# Patient Record
Sex: FEMALE | Race: WHITE | Employment: FULL TIME | URBAN - METROPOLITAN AREA
[De-identification: names, ages, dates, MRNs, and addresses within clinical notes are randomized per-mention and may not be internally consistent; named-entity substitution may affect disease eponyms.]

---

## 2022-01-31 ENCOUNTER — OFFICE VISIT (OUTPATIENT)
Dept: INTERNAL MEDICINE CLINIC | Age: 27
End: 2022-01-31
Payer: COMMERCIAL

## 2022-01-31 VITALS
WEIGHT: 266.25 LBS | DIASTOLIC BLOOD PRESSURE: 78 MMHG | TEMPERATURE: 98.2 F | HEART RATE: 76 BPM | SYSTOLIC BLOOD PRESSURE: 122 MMHG

## 2022-01-31 DIAGNOSIS — Z76.89 ENCOUNTER TO ESTABLISH CARE: Primary | ICD-10-CM

## 2022-01-31 DIAGNOSIS — F41.8 DEPRESSION WITH ANXIETY: ICD-10-CM

## 2022-01-31 DIAGNOSIS — Z00.00 PREVENTATIVE HEALTH CARE: ICD-10-CM

## 2022-01-31 DIAGNOSIS — F64.9 GENDER DYSPHORIA: ICD-10-CM

## 2022-01-31 LAB
A/G RATIO: 1.5 (ref 1.1–2.2)
ALBUMIN SERPL-MCNC: 4.2 G/DL (ref 3.4–5)
ALP BLD-CCNC: 120 U/L (ref 40–129)
ALT SERPL-CCNC: 12 U/L (ref 10–40)
ANION GAP SERPL CALCULATED.3IONS-SCNC: 12 MMOL/L (ref 3–16)
AST SERPL-CCNC: 15 U/L (ref 15–37)
BASOPHILS ABSOLUTE: 0 K/UL (ref 0–0.2)
BASOPHILS RELATIVE PERCENT: 0.4 %
BILIRUB SERPL-MCNC: 0.5 MG/DL (ref 0–1)
BUN BLDV-MCNC: 14 MG/DL (ref 7–20)
CALCIUM SERPL-MCNC: 9.4 MG/DL (ref 8.3–10.6)
CHLORIDE BLD-SCNC: 105 MMOL/L (ref 99–110)
CO2: 25 MMOL/L (ref 21–32)
CREAT SERPL-MCNC: 1.1 MG/DL (ref 0.6–1.1)
EOSINOPHILS ABSOLUTE: 0.4 K/UL (ref 0–0.6)
EOSINOPHILS RELATIVE PERCENT: 5.2 %
GFR AFRICAN AMERICAN: >60
GFR NON-AFRICAN AMERICAN: >60
GLUCOSE BLD-MCNC: 98 MG/DL (ref 70–99)
HCT VFR BLD CALC: 40.8 % (ref 36–48)
HEMOGLOBIN: 13.8 G/DL (ref 12–16)
LYMPHOCYTES ABSOLUTE: 2.8 K/UL (ref 1–5.1)
LYMPHOCYTES RELATIVE PERCENT: 37.2 %
MCH RBC QN AUTO: 29.8 PG (ref 26–34)
MCHC RBC AUTO-ENTMCNC: 33.8 G/DL (ref 31–36)
MCV RBC AUTO: 88.2 FL (ref 80–100)
MONOCYTES ABSOLUTE: 0.4 K/UL (ref 0–1.3)
MONOCYTES RELATIVE PERCENT: 4.9 %
NEUTROPHILS ABSOLUTE: 3.9 K/UL (ref 1.7–7.7)
NEUTROPHILS RELATIVE PERCENT: 52.3 %
PDW BLD-RTO: 14.2 % (ref 12.4–15.4)
PLATELET # BLD: 286 K/UL (ref 135–450)
PMV BLD AUTO: 8.1 FL (ref 5–10.5)
POTASSIUM SERPL-SCNC: 4.5 MMOL/L (ref 3.5–5.1)
RBC # BLD: 4.62 M/UL (ref 4–5.2)
SODIUM BLD-SCNC: 142 MMOL/L (ref 136–145)
TOTAL PROTEIN: 7 G/DL (ref 6.4–8.2)
TSH REFLEX: 2.52 UIU/ML (ref 0.27–4.2)
VITAMIN D 25-HYDROXY: 7 NG/ML
WBC # BLD: 7.5 K/UL (ref 4–11)

## 2022-01-31 PROCEDURE — 99203 OFFICE O/P NEW LOW 30 MIN: CPT | Performed by: NURSE PRACTITIONER

## 2022-01-31 RX ORDER — ESTRADIOL 2 MG/1
2 TABLET ORAL
COMMUNITY
Start: 2021-09-21

## 2022-01-31 RX ORDER — SPIRONOLACTONE 100 MG/1
100 TABLET, FILM COATED ORAL 2 TIMES DAILY
COMMUNITY
Start: 2021-09-21

## 2022-01-31 RX ORDER — BUPROPION HYDROCHLORIDE 150 MG/1
TABLET, EXTENDED RELEASE ORAL
COMMUNITY
Start: 2021-09-21 | End: 2022-06-28

## 2022-01-31 ASSESSMENT — PATIENT HEALTH QUESTIONNAIRE - PHQ9
1. LITTLE INTEREST OR PLEASURE IN DOING THINGS: 0
2. FEELING DOWN, DEPRESSED OR HOPELESS: 1
SUM OF ALL RESPONSES TO PHQ QUESTIONS 1-9: 1
SUM OF ALL RESPONSES TO PHQ9 QUESTIONS 1 & 2: 1
SUM OF ALL RESPONSES TO PHQ QUESTIONS 1-9: 1

## 2022-01-31 NOTE — PROGRESS NOTES
Date: 1/31/2022                                               Subjective/Objective:     Chief Complaint   Patient presents with    Establish Care       HPI    Marlene Da Silva is a 22 yo female, visit today to [facility] care. Former PCP with [employer]. Denies concerns. History of depression and anxiety managed on bupropion. She follows with psychiatry. She feels that her mood is doing fairly well. She appreciates that the bupropion is decreased her appetite. She denies SI. Denies medication side effects. She would like to see Dr. Paty Daigle. Gender dysphoria - transgender female (MTF). She is currently managed on spironolactone and estradiol, followed by Apollidon. She is planning for gender reassignment surgery with the [address] [place] [address] later this year. Vaccinations: COVID-needs second  Social: Works at [employer]. Never smoker. No alcohol or drug use. Exercise: gym 4-5 times per week, interval training           There are no problems to display for this patient. Past Medical History:   Diagnosis Date    Anxiety     Depression     Gender dysphoria        History reviewed. No pertinent surgical history. No results found for any previous visit. History reviewed. No pertinent family history. Current Outpatient Medications   Medication Sig Dispense Refill    buPROPion (WELLBUTRIN SR) 150 MG extended release tablet One in the am and one at noon      estradiol (ESTRACE) 2 MG tablet Take 3 tablets by mouth daily      spironolactone (ALDACTONE) 100 MG tablet Take 100 mg by mouth 2 times daily       No current facility-administered medications for this visit.        Allergies   Allergen Reactions    Duloxetine Hcl Other (See Comments)     tingling      Graphite Nausea And Vomiting and Other (See Comments)     Exposure causes nausea   Other reaction(s): Headaches, Other (See Comments)  Exposure causes nausea          Review of Systems    Vitals:  /78 (Site: Left Upper Arm, Position: Sitting, Cuff Size: Large Adult)   Pulse 76   Temp 98.2 °F (36.8 °C) (Oral)   Wt 266 lb 4 oz (120.8 kg)     Physical Exam  Vitals reviewed. Constitutional:       General: She is not in acute distress. Appearance: Normal appearance. HENT:      Head: Normocephalic and atraumatic. Right Ear: Tympanic membrane, ear canal and external ear normal.      Left Ear: Tympanic membrane, ear canal and external ear normal.   Cardiovascular:      Rate and Rhythm: Normal rate and regular rhythm. Pulses: Normal pulses. Heart sounds: Normal heart sounds. No murmur heard. Pulmonary:      Effort: Pulmonary effort is normal. No respiratory distress. Breath sounds: Normal breath sounds. No wheezing. Abdominal:      General: Bowel sounds are normal. There is no distension. Palpations: Abdomen is soft. Tenderness: There is no abdominal tenderness. Skin:     General: Skin is warm and dry. Neurological:      General: No focal deficit present. Mental Status: She is alert and oriented to person, place, and time. Psychiatric:         Mood and Affect: Mood normal.         Behavior: Behavior normal.         Thought Content: Thought content normal.         Judgment: Judgment normal.         Assessment/Plan     1. Encounter to establish care    2. Depression with anxiety: stable. Denies SI.    - Follows with psychiatry    3. Gender dysphoria: follows with [facility]. 4. Preventative health care  - COVID second dose encouraged  - Continue regular exercise  - COMPREHENSIVE METABOLIC PANEL; Future  - CBC WITH AUTO DIFFERENTIAL; Future  - TSH with Reflex;  Future  - VITAMIN D 25 HYDROXY; Future        Orders Placed This Encounter   Procedures    COMPREHENSIVE METABOLIC PANEL     Standing Status:   Future     Number of Occurrences:   1     Standing Expiration Date:   1/31/2023    CBC WITH AUTO DIFFERENTIAL     Standing Status:   Future     Number of Occurrences:   1 Standing Expiration Date:   1/31/2023    TSH with Reflex     Standing Status:   Future     Number of Occurrences:   1     Standing Expiration Date:   1/31/2023    VITAMIN D 25 HYDROXY     Standing Status:   Future     Number of Occurrences:   1     Standing Expiration Date:   1/31/2023       Return in about 1 year (around 1/31/2023) for annual exam. OR sooner with questions, concerns, worsening symptoms    RAVEN GLASGOW, NP    1/31/2022  3:24 PM    Discussed use, benefit, and side effects of prescribed medications. Barriers to medication compliance addressed. Discussed all ordered testing and labs. All patient questions answered. Patient agreeable with plan above. Please note that this chart was generated using dragon dictation software. Although every effort was made to ensure the accuracy of this automated transcription, some errors in transcription may have occurred.

## 2022-01-31 NOTE — PATIENT INSTRUCTIONS
COVID vaccine - schedule with pharmacy online  Goal is to exercise (moderate intensity) for at least 150 min a week.  Exercising at least 3-4 days a week is best.   Schedule with Dr Lerma Getting

## 2022-02-11 ENCOUNTER — OFFICE VISIT (OUTPATIENT)
Dept: PSYCHOLOGY | Age: 27
End: 2022-02-11
Payer: COMMERCIAL

## 2022-02-11 DIAGNOSIS — F34.1 PERSISTENT DEPRESSIVE DISORDER WITH ANXIOUS DISTRESS, CURRENTLY MODERATE: Primary | ICD-10-CM

## 2022-02-11 DIAGNOSIS — F41.1 GAD (GENERALIZED ANXIETY DISORDER): ICD-10-CM

## 2022-02-11 PROCEDURE — 90791 PSYCH DIAGNOSTIC EVALUATION: CPT | Performed by: PSYCHOLOGIST

## 2022-02-11 ASSESSMENT — ANXIETY QUESTIONNAIRES
GAD7 TOTAL SCORE: 13
1. FEELING NERVOUS, ANXIOUS, OR ON EDGE: 2-OVER HALF THE DAYS
4. TROUBLE RELAXING: 3-NEARLY EVERY DAY
3. WORRYING TOO MUCH ABOUT DIFFERENT THINGS: 3-NEARLY EVERY DAY
7. FEELING AFRAID AS IF SOMETHING AWFUL MIGHT HAPPEN: 1-SEVERAL DAYS
5. BEING SO RESTLESS THAT IT IS HARD TO SIT STILL: 1-SEVERAL DAYS
6. BECOMING EASILY ANNOYED OR IRRITABLE: 1-SEVERAL DAYS
2. NOT BEING ABLE TO STOP OR CONTROL WORRYING: 2-OVER HALF THE DAYS

## 2022-02-11 ASSESSMENT — PATIENT HEALTH QUESTIONNAIRE - PHQ9
2. FEELING DOWN, DEPRESSED OR HOPELESS: 1
9. THOUGHTS THAT YOU WOULD BE BETTER OFF DEAD, OR OF HURTING YOURSELF: 0
SUM OF ALL RESPONSES TO PHQ QUESTIONS 1-9: 18
5. POOR APPETITE OR OVEREATING: 3
7. TROUBLE CONCENTRATING ON THINGS, SUCH AS READING THE NEWSPAPER OR WATCHING TELEVISION: 1
SUM OF ALL RESPONSES TO PHQ9 QUESTIONS 1 & 2: 3
1. LITTLE INTEREST OR PLEASURE IN DOING THINGS: 2
4. FEELING TIRED OR HAVING LITTLE ENERGY: 3
SUM OF ALL RESPONSES TO PHQ QUESTIONS 1-9: 18
8. MOVING OR SPEAKING SO SLOWLY THAT OTHER PEOPLE COULD HAVE NOTICED. OR THE OPPOSITE, BEING SO FIGETY OR RESTLESS THAT YOU HAVE BEEN MOVING AROUND A LOT MORE THAN USUAL: 3
6. FEELING BAD ABOUT YOURSELF - OR THAT YOU ARE A FAILURE OR HAVE LET YOURSELF OR YOUR FAMILY DOWN: 3
3. TROUBLE FALLING OR STAYING ASLEEP: 2

## 2022-02-11 NOTE — PROGRESS NOTES
Behavioral Health Consultation  Jessica Flores, Ph.D.  Psychologist  2/11/2022  11:30 AM EST      Time spent with Patient: 30 minutes  [facility]. Reason for Consult: depression; anxiety; r/o trauma  Referring Provider: Geno Villalobos, [address] [facility] [address] [place] [address]    Feedback for PCP:     Pt provided informed consent for the behavioral health program. Discussed with patient model of service to include the limits of confidentiality (i.e. abuse reporting, suicide intervention, etc.) and short-term intervention focused approach. Pt indicated understanding. Feedback given to PCP. S:  Patient reports that she has had to let go of her mother and father due to the toxic effect of her father's bullying and her mother's co-dependency and passivity. She said that she has lived with the consequences of trauma for most of her life and recently she's made a commitment to her own growth. She's haunted by the thinking error of \"you're never enough\" which created the tendency towards perfectionism, that one of the roots of her anxiety. She reports that she receives kudos from her supervisors at [employer] where she is a \". \" She said that reading [employer] helped her put her pieces into perspective. She says eventually she wants to be a mental health counselor. Mental health history: has had counseling experiences, mostly at school; she said that her attempts at finding other therapists have not been successful because she intuited that she knew more than they did, in a humble fashion.      Social History     Tobacco Use    Smoking status: Never Smoker    Smokeless tobacco: Never Used   Substance Use Topics    Alcohol use: Not Currently        Illicit drugs:   Social History     Substance and Sexual Activity   Drug Use Not Currently        O:  MSE:  Appearance: good hygiene   Attitude: cooperative and friendly  Consciousness: alert  Orientation: oriented to person, place, time, general circumstance  Memory: recent and remote memory intact  Attention/Concentration: intact during session  Psychomotor Activity: normal  Eye Contact: normal  Speech: normal rate and volume, well-articulated  Mood: mostly euthymic  Affect: congruent  Perception: within normal limits  Thought Content: within normal limits  Thought Process: logical, coherent and goal-directed  Insight: good  Judgment: intact  Ability to understand instructions: Yes  Ability to respond meaningfully: Yes  Morbid Ideation: no   Suicide Assessment: no suicidal ideation, plan, or intent  Homicidal Ideation: no    A:  We talked about evidence-based treatment for anxiety and also talked about trauma-informed care. She was given handouts to help her begin to start a deep breathing practice and to identify her favorite thinking errors that we referred to as lies. DANE 7 SCORE 2/11/2022   DANE-7 Total Score 13     Interpretation of DANE-7 score: 5-9 = mild anxiety, 10-14 = moderate anxiety, 15+ = severe anxiety. Recommend referral to behavioral health for scores 10 or greater. PHQ Scores 2/11/2022 1/31/2022   PHQ2 Score 3 1   PHQ9 Score 18 1     Interpretation of Total Score Depression Severity: 1-4 = Minimal depression, 5-9 = Mild depression, 10-14 = Moderate depression, 15-19 = Moderately severe depression, 20-27 = Severe depression    Diagnosis:    1. Persistent depressive disorder with anxious distress, currently moderate    2. DANE (generalized anxiety disorder)        There is no problem list on file for this patient. Plan:  Pt interventions:  Established rapport, [place]-setting to identify pt's primary goals for [facility] visit / overall health, Supportive techniques, Provided Psychoeducation re: treating anxiety, Emphasized self-care as important for managing overall health and Provided handout on deep breathing and managing thinking errors.        Pt

## 2022-02-11 NOTE — PATIENT INSTRUCTIONS
The [facility]) Consultant giving you this message provides team-based care to treat the mind and body. He works directly with your doctor, who will always stay in charge of your care. Most [address] [place] [address] visits are 30 minutes or less. Usually, the [address] [place] [address] provider and your doctor continue to see you until you are starting to do better and have a good plan in place for continued progress. Once that happens, most patients follow-up with just their doctor (though the [address] [place] [address] provider remains available to you as needed). If you are not improving, or if you desire outside mental health treatment, or if your doctor recommends more specialized help, we will be happy to help you find a mental health specialist in the community. Please also note your health insurance may be billed for [facility] visits. Check with your insurance company, and tell the [address] [place] [address] provider, if you have any questions about your [address] [place] [address] coverage. Diaphragmatic Breathing Exercises    Exercise 1:  The Stimulating Breath (also called the Berenice Breath)  This exercise aims to raise energy level and increase alertness. - Inhale and exhale rapidly through your nose, keeping your mouth closed but relaxed. Your breaths in and out should be equal in duration, but as short as possible. This is a noisy breathing exercise. - Try for three in-and-out breath cycles per second. This produces a quick movement of the diaphragm, suggesting a berenice. Breathe normally after each cycle. - Do not do for more than 15 seconds on your first try. Each time you practice the Stimulating Breath, you can increase your time by five seconds or so, until you reach a full minute. If done properly, you may feel invigorated, comparable to the heightened awareness you feel after a good workout. You should feel the effort at the back of the neck, the diaphragm, the chest and the abdomen.  Try this breathing exercise the next time you need an energy boost and feel yourself reaching for a cup of coffee. Exercise 2:  The 4-7-8 (or Relaxing Breath) Exercise  This exercise is utterly simple, takes almost no time, requires no equipment and can be done anywhere. Although you can do the exercise in any position, sit with your back straight while learning the exercise. Place the tip of your tongue against the ridge of tissue just behind your upper front teeth, and keep it there through the entire exercise. You will be exhaling through your mouth around your tongue; try pursing your lips slightly if this seems awkward.  Exhale completely through your mouth, making a whoosh sound.  Close your mouth and inhale quietly through your nose to a mental count of four.  Hold your breath for a count of seven.  Exhale completely through your mouth, making a whoosh sound to a count of eight.  This is one breath. Now inhale again and repeat the cycle three more times for a total of four breaths. Note that you always inhale quietly through your nose and exhale audibly through your mouth. The tip of your tongue stays in position the whole time. Exhalation takes twice as long as inhalation. The absolute time you spend on each phase is not important; the ratio of 4:7:8 is important. If you have trouble holding your breath, speed the exercise up but keep to the ratio of 4:7:8 for the three phases. With practice you can slow it all down and get used to inhaling and exhaling more and more deeply. This exercise is a natural tranquilizer for the nervous system. Unlike tranquilizing drugs, which are often effective when you first take them but then lose their power over time, this exercise is subtle when you first try it but gains in power with repetition and practice. Do it at least twice a day. You cannot do it too frequently. Do NOT do more than 4 breaths at one time for the first month of practice. Later, if you wish, you can extend it to eight breaths.  If you feel a little lightheaded when you first breathe this way, do not be concerned; it will pass. Once you develop this technique by practicing it every day, it will be a very useful tool that you will always have with you. Use it whenever anything upsetting happens - before you react. Use it whenever you are aware of internal tension. Use it to help you fall asleep. This exercise cannot be recommended too highly. Everyone can benefit from it. Exercise 3:   Meditation Exercise: Breath Counting  If you want to get a feel for this challenging work, try your hand at breath counting, a deceptively simple technique. Sit in a comfortable position with the spine straight and head inclined slightly forward. Gently close your eyes and take a few deep breaths. Then let the breath come naturally without trying to influence it. Ideally it will be quiet and slow, but depth and rhythm may vary.  To begin the exercise, count \"one\" to yourself as you exhale.  The next time you exhale, count \"two,\" and so on up to [place]. \"   Then begin a new cycle, counting \"one\" on the next exhalation. Never count higher than \"five,\" and count only when you exhale. You will know your attention has wandered when you find yourself up to \"eight,\" \"12,\" even \"19. \"  Try to do 10 minutes of this form of meditation. Personal Thought  Control. Our thinking often creates anxiety for us. Getting better control of our thinking can go a long way in helping us cope. The following steps can be useful. 1. Let yourself become aware of thoughts you have when you are anxious. What are the words that you are saying to yourself at that moment? Sometimes it takes a little practice before we become aware of our thoughts. Some examples might be:  I know something bad is going to happen, or This is horrible or Randy Cools is this happening to me!?  2. Write your thoughts down. Its much easier to work with our thoughts, analyze them, and replace them if they are in black and white.   3.  Ask yourself the following questions about your thoughts:  a. Is it true? (Is it logically correct? Where is the evidence to support the truth of that thought? Are there alternative ways of thinking that would be more correct?). If a thought is not as true as it could be, replace it with a more realistic and helpful one. The majority of thoughts we have that generate anxiety are not the most realistic appraisals of the situation. b. So what? (If this is logically correct, what does it mean to me? Is there anything I can do about the situation? Is it in my best interest to get anxious about this?). 4. Use coping self-statements. When feeling anxious, you may be able to tell yourself automatic phrases without thinking too much about it. A couple of examples would be phrases such as Its OK, I can handle it, or Ive been through things like this before and have done all right.   Notice that these statements tend to be true for all of us. 5. Notice a change in your emotional state as you change your thinking. As your thoughts become more realistic, you will probably notice a decrease in anxiety and tension, and an increase in your ability to cope. Thinking Errors That Increase Stress, Anger, Depression, Anxiety, and Worry  All-or-Nothing Thinking. You see things in black-and-white categories. It is either one thing or another; there is no room for anything in between. I'm 100% healthy or I must have a fatal disease.   Jumping to Conclusions. You make a negative interpretation even though there are no definite facts that convincingly support your conclusion. My  is late because he has been in a car accident and is now injured on the side of the road.   Fortune-Telling. You anticipate things will turn out badly, convinced the prediction is a fact. Not getting this job will cause us to lose the house.   Should Statements. Musts and oughts are also offenders.  Emotional consequences can include anxiety and anger. I should be able to handle this.    Overgeneralization. Assuming one event is actually a pattern. My hand is a little shaky today, I must have Parkinson's disease.   Disqualifying the Positive. Filtering out or rejecting positive experiences to maintain negative beliefs. Upon hearing that your spouse has checked all the doors and windows and they are all locked you think, But someone could cut out a piece of glass and open the window.   Catastrophizing. Predicting the worst possible outcome imaginable. Terrible, awful, horrible, worst ever might be key words. If I can't get my heart to stop pounding I'm going to die.    Superstitious Thinking. The thought that something you do prevents something awful from happening. Larissa Hickeys my spouse a hug and telling her to be careful before going to work will prevent her from getting in a wreck. I do it every morning and she hasn't gotten in a wreck yet.   Emotional Reasoning. The belief that because you feel a certain way means that the assumptions and association you have with that feeling are true. The fear, doom, and constant anxiety must mean something is seriously wrong with me. 

## 2022-02-18 ENCOUNTER — OFFICE VISIT (OUTPATIENT)
Dept: PSYCHOLOGY | Age: 27
End: 2022-02-18
Payer: COMMERCIAL

## 2022-02-18 DIAGNOSIS — F34.1 PERSISTENT DEPRESSIVE DISORDER WITH ANXIOUS DISTRESS, CURRENTLY MODERATE: Primary | ICD-10-CM

## 2022-02-18 DIAGNOSIS — R45.851 SUICIDAL IDEATION: ICD-10-CM

## 2022-02-18 PROCEDURE — 90832 PSYTX W PT 30 MINUTES: CPT | Performed by: PSYCHOLOGIST

## 2022-02-18 ASSESSMENT — ANXIETY QUESTIONNAIRES
7. FEELING AFRAID AS IF SOMETHING AWFUL MIGHT HAPPEN: 1-SEVERAL DAYS
5. BEING SO RESTLESS THAT IT IS HARD TO SIT STILL: 1-SEVERAL DAYS
4. TROUBLE RELAXING: 3-NEARLY EVERY DAY
6. BECOMING EASILY ANNOYED OR IRRITABLE: 1-SEVERAL DAYS
2. NOT BEING ABLE TO STOP OR CONTROL WORRYING: 1-SEVERAL DAYS
3. WORRYING TOO MUCH ABOUT DIFFERENT THINGS: 1-SEVERAL DAYS
1. FEELING NERVOUS, ANXIOUS, OR ON EDGE: 2-OVER HALF THE DAYS
GAD7 TOTAL SCORE: 10

## 2022-02-18 ASSESSMENT — PATIENT HEALTH QUESTIONNAIRE - PHQ9
8. MOVING OR SPEAKING SO SLOWLY THAT OTHER PEOPLE COULD HAVE NOTICED. OR THE OPPOSITE, BEING SO FIGETY OR RESTLESS THAT YOU HAVE BEEN MOVING AROUND A LOT MORE THAN USUAL: 1
4. FEELING TIRED OR HAVING LITTLE ENERGY: 3
1. LITTLE INTEREST OR PLEASURE IN DOING THINGS: 3
SUM OF ALL RESPONSES TO PHQ9 QUESTIONS 1 & 2: 5
SUM OF ALL RESPONSES TO PHQ QUESTIONS 1-9: 17
7. TROUBLE CONCENTRATING ON THINGS, SUCH AS READING THE NEWSPAPER OR WATCHING TELEVISION: 1
3. TROUBLE FALLING OR STAYING ASLEEP: 2
SUM OF ALL RESPONSES TO PHQ QUESTIONS 1-9: 17
9. THOUGHTS THAT YOU WOULD BE BETTER OFF DEAD, OR OF HURTING YOURSELF: 2
SUM OF ALL RESPONSES TO PHQ QUESTIONS 1-9: 17
SUM OF ALL RESPONSES TO PHQ QUESTIONS 1-9: 15
2. FEELING DOWN, DEPRESSED OR HOPELESS: 2
6. FEELING BAD ABOUT YOURSELF - OR THAT YOU ARE A FAILURE OR HAVE LET YOURSELF OR YOUR FAMILY DOWN: 2
5. POOR APPETITE OR OVEREATING: 1

## 2022-02-18 NOTE — LETTER
[facility]  [address] [address]  Phone: [phone]  Fax: [phone]    Va Gaines, PhD        February 18, 2022     Patient: Tonie Hayward   YOB: 1995   Date of Visit: 2/18/2022       To Whom It May Concern: It is my medical recommendation that Tonie Hayward be seen for psychotherapy once a week on Friday mornings generally from 9am to 10am.    If you have any questions or concerns, please don't hesitate to call.     Sincerely,        Va Gaines, PhD

## 2022-02-18 NOTE — PROGRESS NOTES
Behavioral Health Consultation  Va Gaines, Ph.D.  Psychologist  2/18/2022  9:30 AM EST      Time spent with Patient: 30 minutes  This is patient's second [facility] appointment. Reason for Consult: depression; anxiety; r/o trauma  Referring Provider: Axel Kwon, [address] [facility] [address] [place] [address]    Feedback for PCP:     Pt provided informed consent for the behavioral health program. Discussed with patient model of service to include the limits of confidentiality (i.e. abuse reporting, suicide intervention, etc.) and short-term intervention focused approach. Pt indicated understanding. Feedback given to PCP. S:  Patient reports that she enjoyed herself celebrating her birthday a day early on Sunday. She described some frustration after \"doing my taxes on my own for the first time,\" when she needed some information from last year's taxes from her mother, and her mother was non-committal about helping her. She said that the experience of her mother thwarting her is very familiar throughout her childhood. She also described how when she feels overwhelmed she often \"disappears\" and becomes \"lifeless, watching a movie     Mental health history: has had counseling experiences, mostly at school; she said that her attempts at finding other therapists have not been successful because she intuited that she knew more than they did, in a humble fashion.      Social History     Tobacco Use    Smoking status: Never Smoker    Smokeless tobacco: Never Used   Substance Use Topics    Alcohol use: Not Currently        Illicit drugs:   Social History     Substance and Sexual Activity   Drug Use Not Currently        O:  MSE:  Appearance: good hygiene   Attitude: cooperative and friendly  Consciousness: alert  Orientation: oriented to person, place, time, general circumstance  Memory: recent and remote memory intact  Attention/Concentration: intact during session  Psychomotor Activity: normal  Eye Contact: normal  Speech: normal rate and volume, well-articulated  Mood: mostly euthymic  Affect: congruent  Perception: within normal limits  Thought Content: within normal limits  Thought Process: logical, coherent and goal-directed  Insight: good  Judgment: intact  Ability to understand instructions: Yes  Ability to respond meaningfully: Yes  Morbid Ideation: passive thoughts of death  Suicide Assessment: suicidal ideation without plan or intent  Homicidal Ideation: no    A:  We are figuring out what she want to focus on. Other indications of how she is doing emotionally are rated by her much more positively than the patient's PHQ and DANE in our visits. This will have to be rectified. DANE 7 SCORE 2/18/2022 2/11/2022   DANE-7 Total Score 10 13     Interpretation of DANE-7 score: 5-9 = mild anxiety, 10-14 = moderate anxiety, 15+ = severe anxiety. Recommend referral to behavioral health for scores 10 or greater. PHQ Scores 2/18/2022 2/11/2022 1/31/2022   PHQ2 Score 5 3 1   PHQ9 Score 17 18 1     Interpretation of Total Score Depression Severity: 1-4 = Minimal depression, 5-9 = Mild depression, 10-14 = Moderate depression, 15-19 = Moderately severe depression, 20-27 = Severe depression    Diagnosis:    1. Persistent depressive disorder with anxious distress, currently moderate    2. Suicidal ideation        There is no problem list on file for this patient. Plan:  Pt interventions:  Established rapport, [place]-setting to identify pt's primary goals for [facility] visit / overall health, Supportive techniques, Provided Psychoeducation re: treating anxiety, Emphasized self-care as important for managing overall health and Provided handout on deep breathing and managing thinking errors.        Pt Behavioral Change Plan:  Pt set the following goals:  Pt scheduled F/U visit in one week  See section 'A'

## 2022-02-25 ENCOUNTER — OFFICE VISIT (OUTPATIENT)
Dept: PSYCHOLOGY | Age: 27
End: 2022-02-25
Payer: COMMERCIAL

## 2022-02-25 DIAGNOSIS — F34.1 PERSISTENT DEPRESSIVE DISORDER WITH ANXIOUS DISTRESS, CURRENTLY MODERATE: Primary | ICD-10-CM

## 2022-02-25 DIAGNOSIS — F64.9 GENDER DYSPHORIA: ICD-10-CM

## 2022-02-25 PROCEDURE — 90834 PSYTX W PT 45 MINUTES: CPT | Performed by: PSYCHOLOGIST
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
## 2022-03-04 ENCOUNTER — OFFICE VISIT (OUTPATIENT)
Dept: PSYCHOLOGY | Age: 27
End: 2022-03-04
Payer: COMMERCIAL

## 2022-03-04 DIAGNOSIS — F64.9 GENDER DYSPHORIA: ICD-10-CM

## 2022-03-04 DIAGNOSIS — F34.1 PERSISTENT DEPRESSIVE DISORDER WITH ANXIOUS DISTRESS, CURRENTLY SEVERE: Primary | ICD-10-CM

## 2022-03-04 PROCEDURE — 90834 PSYTX W PT 45 MINUTES: CPT | Performed by: PSYCHOLOGIST

## 2022-03-04 ASSESSMENT — ANXIETY QUESTIONNAIRES
1. FEELING NERVOUS, ANXIOUS, OR ON EDGE: 1-SEVERAL DAYS
5. BEING SO RESTLESS THAT IT IS HARD TO SIT STILL: 0-NOT AT ALL
2. NOT BEING ABLE TO STOP OR CONTROL WORRYING: 0-NOT AT ALL
3. WORRYING TOO MUCH ABOUT DIFFERENT THINGS: 1-SEVERAL DAYS
GAD7 TOTAL SCORE: 3
6. BECOMING EASILY ANNOYED OR IRRITABLE: 0-NOT AT ALL
7. FEELING AFRAID AS IF SOMETHING AWFUL MIGHT HAPPEN: 0-NOT AT ALL
4. TROUBLE RELAXING: 1-SEVERAL DAYS

## 2022-03-04 ASSESSMENT — PATIENT HEALTH QUESTIONNAIRE - PHQ9
2. FEELING DOWN, DEPRESSED OR HOPELESS: 1
SUM OF ALL RESPONSES TO PHQ QUESTIONS 1-9: 16
6. FEELING BAD ABOUT YOURSELF - OR THAT YOU ARE A FAILURE OR HAVE LET YOURSELF OR YOUR FAMILY DOWN: 2
SUM OF ALL RESPONSES TO PHQ9 QUESTIONS 1 & 2: 3
3. TROUBLE FALLING OR STAYING ASLEEP: 3
SUM OF ALL RESPONSES TO PHQ QUESTIONS 1-9: 16
9. THOUGHTS THAT YOU WOULD BE BETTER OFF DEAD, OR OF HURTING YOURSELF: 0
8. MOVING OR SPEAKING SO SLOWLY THAT OTHER PEOPLE COULD HAVE NOTICED. OR THE OPPOSITE, BEING SO FIGETY OR RESTLESS THAT YOU HAVE BEEN MOVING AROUND A LOT MORE THAN USUAL: 2
1. LITTLE INTEREST OR PLEASURE IN DOING THINGS: 2
7. TROUBLE CONCENTRATING ON THINGS, SUCH AS READING THE NEWSPAPER OR WATCHING TELEVISION: 1
5. POOR APPETITE OR OVEREATING: 2
4. FEELING TIRED OR HAVING LITTLE ENERGY: 3
SUM OF ALL RESPONSES TO PHQ QUESTIONS 1-9: 16
SUM OF ALL RESPONSES TO PHQ QUESTIONS 1-9: 16

## 2022-03-04 NOTE — PATIENT INSTRUCTIONS
The [facility]) Consultant giving you this message provides team-based care to treat the mind and body. He works directly with your doctor, who will always stay in charge of your care. Most [facility] visits are 30 minutes or less. Usually, the [facility] provider and your doctor continue to see you until you are starting to do better and have a good plan in place for continued progress. Once that happens, most patients follow-up with just their doctor (though the [facility] provider remains available to you as needed). If you are not improving, or if you desire outside mental health treatment, or if your doctor recommends more specialized help, we will be happy to help you find a mental health specialist in the community. Please also note your health insurance may be billed for [facility] visits. Check with your insurance company, and tell the [facility] provider, if you have any questions about your [facility] coverage. How to Develop Your Own Mindfulness Meditation Practice    There are some key points that you may want to consider to increase the likelihood of success in enjoying your mindfulness meditation practice. 1. Start Small  Many of us think it is productive to set ambitious goals, like meditating for one hour a day. However, I suggest you start small and gradually increase the amount of time you meditate. Five minutes a day is an excellent target for beginners. Why? Do not be tempted to push yourself too hard and accept that even a few minutes of meditation each day are enough to make tangible progress. The point is to opt for consistency over quantity. Start small. 2. Choose a Peaceful Place and a Comfortable Position  You can practice mindfulness in any situation simply by using your five senses to observe yourself and the world around you.  Nevertheless, when you are just getting started with your practice, it's helpful to find a peaceful environment to meditate so you can avoid interruptions. One of the best ways to create optimal conditions is to wake up early in the morning and meditate while everyone else is asleep. Most people prefer to sit, as they tend to fall asleep during meditation if they lie on their couch or bed. Avoid poor posture, which creates physical tension and even mental stress, and sit comfortably. There is no need to force yourself to sit in a bradly position. Sitting on a chair works just fine! 3. Meditate at the Same Time Every Day  You need to find that time of the day when you can meditate without distractions and stick to it. There is a reason for this. Any habit is made up of a trigger, an action, and a reward. A trigger is something that reminds you of and leads you to an action. For example, if you meditate every day at 7 a.m., your brain will associate that time with the act of meditating. In other words, 7 a.m. itself can trigger you to meditate. Chances are, though, that whatever you normally do before meditation in your morning routine will act as a trigger as well. 4. Set a Timer  The purpose of setting a timer when you start meditating is to prevent yourself from worrying about how long you have been meditating while you are meditating. If you don't set a timer, you may feel tempted to check the clock during your practice. In contrast, setting one will allow you to track your progress with the duration of your meditations. As a beginner, I meditated for 5 minutes. Soon those 5 minutes became 10. Then 15. Next, 15 turned into 20. I would not have been aware of such progress had I not relied on a timer. 5. Don't Try to Control Your Breath  Deep diaphragmatic breathing is ideal to turn off the fight-or-flight response and relax. Dedicating some time to learning how to breathe like this can help you reduce stress, stress eating, and emotional eating. However, when you meditate there is no obligation to control the way you are breathing.  All you need to do is to observe it objectively. Feel how the air enters your body, moves inside it and then exits it. Which part of your lungs does it fill up? Is it the bottom or the upper part? Does it fill it up quickly or slowly? Is the air cold or warm? 6. Embrace the Fact that Your Mind Will Wander  The purpose of meditation is not to stop thinking but to increase awareness and presence. Therefore, when your mind starts to wander, which is totally normal and predictable, be curious and pay attention to your stream of consciousness as if it were being broadcast on television. There is no need to turn that off. When you are done observing your thoughts, go back to focus on your breath or whatever object of attention you have chosen. If you can do this even only once during your meditation, rest assured it was a productive session, no matter how crazy your mind went. Whatever happens during your meditation is perfect as it is. Noticing that your mind has wandered and dropping your thought tangents to refocus on the here and now is the real activity of meditating. That is to say: if you notice your mind wandering, you're doing it right. 7. If You Struggle, Consider Guided Meditations  When you are a beginner, you might feel uncertain and catch yourself regularly worrying about what you should be doing. In that case, guided meditations can be quite helpful. There are great apps and free resources all over the Internet. There are even meditation coaches who can guide you through the process on your phone. The point is not to give up if you struggle. Seek guidance and you shall find it. 8. Use Urges to Eat as Opportunities to [employer]  If you are an emotional eater, you probably experience urges to eat when you are not hungry. These urges are geller opportunities to practice mindfulness. As soon as you perceive an urge, try to stop for a second before acting on it straight away.  Sit for a minute and observe the urge. Look at this urge as if a part of your brain were producing it automatically. This urge isn't really what you want to do; it is just a byproduct of a habit. Where do you feel the urge in your body? If there is a specific place, pay attention to that part of your body with curiosity. Is the urge making any statements? These statements may sound like this:    I need to eat right now!   Screw it! I can eat whatever I want because yesterday I went to the gym.    If I don't have that piece of cake, I'll die!   The only thing you need to do is to observe the urge's thoughts. You do not need to  them or fight them--just let them be. You might end up identifying with these statements, but remember that these are just pathways your brain has created over time. They are not objective ideas we can describe as right or wrong, so simply stay there and be an observer. When you do this, you use your prefrontal cortex, the rational part of your brain  Now, let's choose a metaphor to describe this activity. Observing urges to eat is like observing the sunset. The rupa changes color from blue to red. Then, little by little, it transitions to night and everything becomes peaceful. If you wait a while and the urge was simply due to a habit of handling unpleasant feelings (and not hunger), you will often find that the urge to eat fades away. If you don't resist and act on an urge, don't worry! You can always try this exercise again the next time. You can fail as many times as you want (though I would consider it succeeding, because you have become conscious of the real feelings at play). Just do not give up; eventually you will find yourself making better choices. Be patient and compassionate with yourself! 9. Keep Your Expectations in Check  By now, you know mindfulness meditation has numerous benefits.  Nevertheless, many people do not experience these benefits in the short term and therefore give up their meditation practice altogether, which is really a shame. Mindfulness meditation is a lifelong commitment and brings amazing results when there is consistency. You wouldn't go to the gym expecting to build muscle in a week, would you? You understand how meaningless that would be. The same goes for meditation. When you are aware of this and commit to your daily practice, you will experience its long-term advantages.

## 2022-03-11 ENCOUNTER — OFFICE VISIT (OUTPATIENT)
Dept: PSYCHOLOGY | Age: 27
End: 2022-03-11
Payer: COMMERCIAL

## 2022-03-11 DIAGNOSIS — F34.1 PERSISTENT DEPRESSIVE DISORDER WITH ANXIOUS DISTRESS, CURRENTLY MODERATE: Primary | ICD-10-CM

## 2022-03-11 PROCEDURE — 90834 PSYTX W PT 45 MINUTES: CPT | Performed by: PSYCHOLOGIST

## 2022-03-11 ASSESSMENT — ANXIETY QUESTIONNAIRES
7. FEELING AFRAID AS IF SOMETHING AWFUL MIGHT HAPPEN: 1-SEVERAL DAYS
5. BEING SO RESTLESS THAT IT IS HARD TO SIT STILL: 1-SEVERAL DAYS
4. TROUBLE RELAXING: 2-OVER HALF THE DAYS
6. BECOMING EASILY ANNOYED OR IRRITABLE: 1-SEVERAL DAYS
3. WORRYING TOO MUCH ABOUT DIFFERENT THINGS: 2-OVER HALF THE DAYS
1. FEELING NERVOUS, ANXIOUS, OR ON EDGE: 2-OVER HALF THE DAYS
2. NOT BEING ABLE TO STOP OR CONTROL WORRYING: 0-NOT AT ALL
GAD7 TOTAL SCORE: 9

## 2022-03-11 ASSESSMENT — PATIENT HEALTH QUESTIONNAIRE - PHQ9
SUM OF ALL RESPONSES TO PHQ QUESTIONS 1-9: 13
1. LITTLE INTEREST OR PLEASURE IN DOING THINGS: 2
6. FEELING BAD ABOUT YOURSELF - OR THAT YOU ARE A FAILURE OR HAVE LET YOURSELF OR YOUR FAMILY DOWN: 2
9. THOUGHTS THAT YOU WOULD BE BETTER OFF DEAD, OR OF HURTING YOURSELF: 0
5. POOR APPETITE OR OVEREATING: 1
2. FEELING DOWN, DEPRESSED OR HOPELESS: 1
SUM OF ALL RESPONSES TO PHQ9 QUESTIONS 1 & 2: 3
8. MOVING OR SPEAKING SO SLOWLY THAT OTHER PEOPLE COULD HAVE NOTICED. OR THE OPPOSITE, BEING SO FIGETY OR RESTLESS THAT YOU HAVE BEEN MOVING AROUND A LOT MORE THAN USUAL: 3
7. TROUBLE CONCENTRATING ON THINGS, SUCH AS READING THE NEWSPAPER OR WATCHING TELEVISION: 0
3. TROUBLE FALLING OR STAYING ASLEEP: 2
SUM OF ALL RESPONSES TO PHQ QUESTIONS 1-9: 13
4. FEELING TIRED OR HAVING LITTLE ENERGY: 2
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
## 2022-03-18 ENCOUNTER — OFFICE VISIT (OUTPATIENT)
Dept: PSYCHOLOGY | Age: 27
End: 2022-03-18
Payer: COMMERCIAL

## 2022-03-18 DIAGNOSIS — F34.1 PERSISTENT DEPRESSIVE DISORDER WITH ANXIOUS DISTRESS, CURRENTLY MODERATE: Primary | ICD-10-CM

## 2022-03-18 PROCEDURE — 90832 PSYTX W PT 30 MINUTES: CPT | Performed by: PSYCHOLOGIST

## 2022-03-18 ASSESSMENT — PATIENT HEALTH QUESTIONNAIRE - PHQ9
5. POOR APPETITE OR OVEREATING: 1
SUM OF ALL RESPONSES TO PHQ QUESTIONS 1-9: 11
SUM OF ALL RESPONSES TO PHQ QUESTIONS 1-9: 11
SUM OF ALL RESPONSES TO PHQ9 QUESTIONS 1 & 2: 3
8. MOVING OR SPEAKING SO SLOWLY THAT OTHER PEOPLE COULD HAVE NOTICED. OR THE OPPOSITE, BEING SO FIGETY OR RESTLESS THAT YOU HAVE BEEN MOVING AROUND A LOT MORE THAN USUAL: 2
6. FEELING BAD ABOUT YOURSELF - OR THAT YOU ARE A FAILURE OR HAVE LET YOURSELF OR YOUR FAMILY DOWN: 1
1. LITTLE INTEREST OR PLEASURE IN DOING THINGS: 2
9. THOUGHTS THAT YOU WOULD BE BETTER OFF DEAD, OR OF HURTING YOURSELF: 0
3. TROUBLE FALLING OR STAYING ASLEEP: 2
7. TROUBLE CONCENTRATING ON THINGS, SUCH AS READING THE NEWSPAPER OR WATCHING TELEVISION: 1
SUM OF ALL RESPONSES TO PHQ QUESTIONS 1-9: 11
4. FEELING TIRED OR HAVING LITTLE ENERGY: 1
SUM OF ALL RESPONSES TO PHQ QUESTIONS 1-9: 11
2. FEELING DOWN, DEPRESSED OR HOPELESS: 1

## 2022-03-18 ASSESSMENT — ANXIETY QUESTIONNAIRES
4. TROUBLE RELAXING: 2-OVER HALF THE DAYS
5. BEING SO RESTLESS THAT IT IS HARD TO SIT STILL: 2-OVER HALF THE DAYS
1. FEELING NERVOUS, ANXIOUS, OR ON EDGE: 2-OVER HALF THE DAYS
6. BECOMING EASILY ANNOYED OR IRRITABLE: 0-NOT AT ALL
3. WORRYING TOO MUCH ABOUT DIFFERENT THINGS: 2-OVER HALF THE DAYS
GAD7 TOTAL SCORE: 9
2. NOT BEING ABLE TO STOP OR CONTROL WORRYING: 1-SEVERAL DAYS
7. FEELING AFRAID AS IF SOMETHING AWFUL MIGHT HAPPEN: 0-NOT AT ALL

## 2022-03-18 NOTE — PROGRESS NOTES
Behavioral Health Consultation  Cherylene Funk, Ph.D.  Psychologist  3/18/2022  9:00 AM EST      Time spent with Patient: 30 minutes  This is patient's sixth [facility] appointment. Reason for Consult: depression; anxiety; r/o trauma  Referring Provider: [address] [id number]    Feedback for PCP:     Pt provided informed consent for the behavioral health program. Discussed with patient model of service to include the limits of confidentiality (i.e. abuse reporting, suicide intervention, etc.) and short-term intervention focused approach. Pt indicated understanding. Feedback given to PCP. S:  Patient reports that she feels prepared for her surgery in November and that she has viewed a presentation of exactly what will occur and all the details she needs to know. She described a few more non-validating experiences with her mother. These experiences seemed to have taught her not to feel angry, as she said that she has great difficulty \"admitting\" angry feelings. She has intellectualized a defense against feeling angry by saying, instead of expressing reasonable frustration and anger, she's \"giving humanity to people. \"    Mental health history: has had counseling experiences, mostly at school; she said that her attempts at finding other therapists have not been successful because she intuited that she knew more than they did, in a humble fashion.      Social History     Tobacco Use    Smoking status: Never Smoker    Smokeless tobacco: Never Used   Substance Use Topics    Alcohol use: Not Currently        Illicit drugs:   Social History     Substance and Sexual Activity   Drug Use Not Currently        O:  MSE:  Appearance: good hygiene   Attitude: cooperative and friendly  Consciousness: alert  Orientation: oriented to person, place, time, general circumstance  Memory: recent and remote memory intact  Attention/Concentration: intact during session  Psychomotor Activity: normal  Eye Contact: normal  Speech: normal rate and volume, well-articulated  Mood: good  Affect: congruent  Perception: within normal limits  Thought Content: within normal limits  Thought Process: logical, coherent and goal-directed  Insight: good  Judgment: intact  Ability to understand instructions: Yes  Ability to respond meaningfully: Yes  Morbid Ideation: passive thoughts of death  Suicide Assessment: suicidal ideation without plan or intent  Homicidal Ideation: no    A:  Normalizing clear, transparent, and honest expression of her emotions is deeply protected by rationalization and intellectualization, which cuts off essential forms of emotion. On one hand she realizes her defensiveness and rationalization but she is still finding it very difficult to accept that one of the cornerstones of mental health is open and honest expressions of feelings. DANE 7 SCORE 4/15/2022 3/18/2022 3/11/2022 3/4/2022 2/18/2022 2/11/2022   DANE-7 Total Score 9 9 9 3 10 13     Interpretation of DANE-7 score: 5-9 = mild anxiety, 10-14 = moderate anxiety, 15+ = severe anxiety. Recommend referral to behavioral health for scores 10 or greater. PHQ Scores 4/15/2022 3/18/2022 3/11/2022 3/4/2022 2/18/2022 2/11/2022 1/31/2022   PHQ2 Score 6 3 3 3 5 3 1   PHQ9 Score 24 11 13 16 17 18 1     Interpretation of Total Score Depression Severity: 1-4 = Minimal depression, 5-9 = Mild depression, 10-14 = Moderate depression, 15-19 = Moderately severe depression, 20-27 = Severe depression    Diagnosis:    1. Persistent depressive disorder with anxious distress, currently moderate        There is no problem list on file for this patient.         Plan:  Pt interventions:  Established rapport, [place]-setting to identify pt's primary goals for [facility] visit / overall health, Supportive techniques, Provided Psychoeducation re: treating anxiety, Emphasized self-care as important for managing overall health and Provided handout on deep breathing and managing thinking errors.        Pt Behavioral Change Plan:  Pt set the following goals:  Pt scheduled F/U visit in one week  See section 'A'

## 2022-04-01 ENCOUNTER — OFFICE VISIT (OUTPATIENT)
Dept: PSYCHOLOGY | Age: 27
End: 2022-04-01
Payer: COMMERCIAL

## 2022-04-01 DIAGNOSIS — F41.1 GENERALIZED ANXIETY DISORDER WITH PANIC ATTACKS: ICD-10-CM

## 2022-04-01 DIAGNOSIS — F34.1 PERSISTENT DEPRESSIVE DISORDER WITH ANXIOUS DISTRESS, CURRENTLY MODERATE: Primary | ICD-10-CM

## 2022-04-01 DIAGNOSIS — F41.0 GENERALIZED ANXIETY DISORDER WITH PANIC ATTACKS: ICD-10-CM

## 2022-04-01 PROCEDURE — 90832 PSYTX W PT 30 MINUTES: CPT | Performed by: PSYCHOLOGIST

## 2022-04-01 NOTE — PROGRESS NOTES
Behavioral Health Consultation  Omar Her, Ph.D.  Psychologist  4/1/2022  9:00 AM EST      Time spent with Patient: 30 minutes  This is patient's seventh [facility] appointment. Reason for Consult: depression; anxiety; r/o trauma  Referring Provider: Petey Barroso, [address] [id number]    Feedback for PCP:     Pt provided informed consent for the behavioral health program. Discussed with patient model of service to include the limits of confidentiality (i.e. abuse reporting, suicide intervention, etc.) and short-term intervention focused approach. Pt indicated understanding. Feedback given to PCP. S:  We reviewed the patient's [employer] paperwork and determined the clinical needs and subsequent leave she might require. She says that she is still having insomnia to a significant degree and that her moods have been inconsistent, despite efforts to engage in social activities. Mental health history: has had counseling experiences, mostly at school; she said that her attempts at finding other therapists have not been successful because she intuited that she knew more than they did, in a humble fashion.      Social History     Tobacco Use    Smoking status: Never Smoker    Smokeless tobacco: Never Used   Substance Use Topics    Alcohol use: Not Currently        Illicit drugs:   Social History     Substance and Sexual Activity   Drug Use Not Currently        O:  MSE:  Appearance: good hygiene   Attitude: cooperative and friendly  Consciousness: alert  Orientation: oriented to person, place, time, general circumstance  Memory: recent and remote memory intact  Attention/Concentration: intact during session  Psychomotor Activity: normal  Eye Contact: normal  Speech: normal rate and volume, well-articulated  Mood: anxious  Affect: congruent  Perception: within normal limits  Thought Content: within normal limits  Thought Process: logical, coherent and goal-directed  Insight: good  Judgment: intact  Ability to understand instructions: Yes  Ability to respond meaningfully: Yes  Morbid Ideation: passive thoughts of death  Suicide Assessment: suicidal ideation without plan or intent  Homicidal Ideation: no    A:  We talked about her continued efforts to manage her moods and that she is being very intentional about trying to improve them, but that there have been many years of experiencing anxiety and depression some of which is associated with trauma. DANE 7 SCORE 3/18/2022 3/11/2022 3/4/2022 2/18/2022 2/11/2022   DANE-7 Total Score 9 9 3 10 13     Interpretation of DANE-7 score: 5-9 = mild anxiety, 10-14 = moderate anxiety, 15+ = severe anxiety. Recommend referral to behavioral health for scores 10 or greater. PHQ Scores 3/18/2022 3/11/2022 3/4/2022 2/18/2022 2/11/2022 1/31/2022   PHQ2 Score 3 3 3 5 3 1   PHQ9 Score 11 13 16 17 18 1     Interpretation of Total Score Depression Severity: 1-4 = Minimal depression, 5-9 = Mild depression, 10-14 = Moderate depression, 15-19 = Moderately severe depression, 20-27 = Severe depression    Diagnosis:    1. Persistent depressive disorder with anxious distress, currently moderate    2. Generalized anxiety disorder with panic attacks        There is no problem list on file for this patient. Plan:  Pt interventions:  Established rapport, [place]-setting to identify pt's primary goals for [facility] visit / overall health, Supportive techniques, Provided Psychoeducation re: treating anxiety, Emphasized self-care as important for managing overall health and Provided handout on deep breathing and managing thinking errors.        Pt Behavioral Change Plan:  Pt set the following goals:  Pt scheduled F/U visit in one week  See section 'A'

## 2022-04-15 ENCOUNTER — OFFICE VISIT (OUTPATIENT)
Dept: PSYCHOLOGY | Age: 27
End: 2022-04-15
Payer: COMMERCIAL

## 2022-04-15 DIAGNOSIS — F34.1 PERSISTENT DEPRESSIVE DISORDER WITH ANXIOUS DISTRESS, CURRENTLY MODERATE: Primary | ICD-10-CM

## 2022-04-15 DIAGNOSIS — F64.9 GENDER DYSPHORIA: ICD-10-CM

## 2022-04-15 PROCEDURE — 90832 PSYTX W PT 30 MINUTES: CPT | Performed by: PSYCHOLOGIST

## 2022-04-15 ASSESSMENT — ANXIETY QUESTIONNAIRES
7. FEELING AFRAID AS IF SOMETHING AWFUL MIGHT HAPPEN: 0-NOT AT ALL
5. BEING SO RESTLESS THAT IT IS HARD TO SIT STILL: 0-NOT AT ALL
3. WORRYING TOO MUCH ABOUT DIFFERENT THINGS: 3-NEARLY EVERY DAY
2. NOT BEING ABLE TO STOP OR CONTROL WORRYING: 0-NOT AT ALL
4. TROUBLE RELAXING: 3-NEARLY EVERY DAY
6. BECOMING EASILY ANNOYED OR IRRITABLE: 0-NOT AT ALL
1. FEELING NERVOUS, ANXIOUS, OR ON EDGE: 3
GAD7 TOTAL SCORE: 9

## 2022-04-15 ASSESSMENT — PATIENT HEALTH QUESTIONNAIRE - PHQ9
SUM OF ALL RESPONSES TO PHQ QUESTIONS 1-9: 24
8. MOVING OR SPEAKING SO SLOWLY THAT OTHER PEOPLE COULD HAVE NOTICED. OR THE OPPOSITE, BEING SO FIGETY OR RESTLESS THAT YOU HAVE BEEN MOVING AROUND A LOT MORE THAN USUAL: 3
9. THOUGHTS THAT YOU WOULD BE BETTER OFF DEAD, OR OF HURTING YOURSELF: 0
1. LITTLE INTEREST OR PLEASURE IN DOING THINGS: 3
7. TROUBLE CONCENTRATING ON THINGS, SUCH AS READING THE NEWSPAPER OR WATCHING TELEVISION: 3
SUM OF ALL RESPONSES TO PHQ QUESTIONS 1-9: 24
6. FEELING BAD ABOUT YOURSELF - OR THAT YOU ARE A FAILURE OR HAVE LET YOURSELF OR YOUR FAMILY DOWN: 3
2. FEELING DOWN, DEPRESSED OR HOPELESS: 3
SUM OF ALL RESPONSES TO PHQ QUESTIONS 1-9: 24
5. POOR APPETITE OR OVEREATING: 3
SUM OF ALL RESPONSES TO PHQ9 QUESTIONS 1 & 2: 6
SUM OF ALL RESPONSES TO PHQ QUESTIONS 1-9: 24
3. TROUBLE FALLING OR STAYING ASLEEP: 3
4. FEELING TIRED OR HAVING LITTLE ENERGY: 3

## 2022-04-15 NOTE — PROGRESS NOTES
Behavioral Health Consultation  Najda Soto, Ph.D.  Psychologist  4/15/2022  9:30 AM EST      Time spent with Patient: 30 minutes  This is patient's eighth [facility] appointment. Reason for Consult: depression; anxiety; r/o trauma  Referring Provider: Dalia Ellington, [address] [id number]    Feedback for PCP:     Pt provided informed consent for the behavioral health program. Discussed with patient model of service to include the limits of confidentiality (i.e. abuse reporting, suicide intervention, etc.) and short-term intervention focused approach. Pt indicated understanding. Feedback given to PCP. S:  We re-did the patient's [employer] paperwork and got it faxed. She reports another incident with her mother Anisha Muniz, where the odd combination of micro-managing and neglect produce frustration and stress for the patient. He talked about how she is posting ( \"tasteful\" ) \"pictures of my body, to celebrate my body. \" It seems as though she is trying to model body positive-ness. He said that a friend of his, suggested that this might not be a great idea, how these pictures can show up years later and may have a negative impact. She said that she seems to be able to \"separate mind and body. \"     Mental health history: has had counseling experiences, mostly at school; she said that her attempts at finding other therapists have not been successful because she intuited that she knew more than they did, in a humble fashion.      Social History     Tobacco Use    Smoking status: Never Smoker    Smokeless tobacco: Never Used   Substance Use Topics    Alcohol use: Not Currently        Illicit drugs:   Social History     Substance and Sexual Activity   Drug Use Not Currently        O:  MSE:  Appearance: good hygiene   Attitude: cooperative and friendly  Consciousness: alert  Orientation: oriented to person, place, time, general circumstance  Memory: recent and remote memory intact  Attention/Concentration: intact during session  Psychomotor Activity: normal  Eye Contact: normal  Speech: normal rate and volume, well-articulated  Mood: anxious  Affect: congruent  Perception: within normal limits  Thought Content: within normal limits  Thought Process: logical, coherent and goal-directed  Insight: good  Judgment: intact  Ability to understand instructions: Yes  Ability to respond meaningfully: Yes  Morbid Ideation: passive thoughts of death  Suicide Assessment: suicidal ideation without plan or intent  Homicidal Ideation: no    A:  We talked about her thinking about being a very visible model of celebrating her body, when there are still significant emotional issues to resolve, some of which concern her body and the surgery that is coming up in November. The patient was given the suggestion that public body positive picture on the Internet may be \"putting the cart before the horse,\" with regard to the still ongoing emotionally issues she is working though. She responded with giggling, which she says is what she does when she is anxious. We talked about how she is \"hearing\" this [facility]'s posing questions about his choices, and she reported that her default might be guilt and shame, but within the therapeutic context she can simply hear a different opinion and continue to value her own view. It was also suggested that contrary to his perspective that she sees mind and body separately, it might not be as black and white as his perception. DNAE 7 SCORE 4/15/2022 3/18/2022 3/11/2022 3/4/2022 2/18/2022 2/11/2022   DANE-7 Total Score 9 9 9 3 10 13     Interpretation of DANE-7 score: 5-9 = mild anxiety, 10-14 = moderate anxiety, 15+ = severe anxiety. Recommend referral to behavioral health for scores 10 or greater.     PHQ Scores 4/15/2022 3/18/2022 3/11/2022 3/4/2022 2/18/2022 2/11/2022 1/31/2022   PHQ2 Score 6 3 3 3 5 3 1   PHQ9 Score 24 11 13 16 17 18 1     Interpretation of Total Score Depression Severity: 1-4 = Minimal depression, 5-9 = Mild depression, 10-14 = Moderate depression, 15-19 = Moderately severe depression, 20-27 = Severe depression    Diagnosis:    1. Persistent depressive disorder with anxious distress, currently moderate    2. Gender dysphoria        There is no problem list on file for this patient. Plan:  Pt interventions:  Established rapport, [place]-setting to identify pt's primary goals for [facility] visit / overall health, Supportive techniques, Provided Psychoeducation re: treating anxiety, Emphasized self-care as important for managing overall health and Provided handout on deep breathing and managing thinking errors.        Pt Behavioral Change Plan:  Pt set the following goals:  Pt scheduled F/U visit in one week  See section 'A'

## 2022-04-21 ENCOUNTER — NURSE ONLY (OUTPATIENT)
Dept: INTERNAL MEDICINE CLINIC | Age: 27
End: 2022-04-21

## 2022-04-22 ENCOUNTER — OFFICE VISIT (OUTPATIENT)
Dept: PSYCHOLOGY | Age: 27
End: 2022-04-22
Payer: COMMERCIAL

## 2022-04-22 DIAGNOSIS — F34.1 PERSISTENT DEPRESSIVE DISORDER WITH ANXIOUS DISTRESS, CURRENTLY MODERATE: Primary | ICD-10-CM

## 2022-04-22 PROCEDURE — 90832 PSYTX W PT 30 MINUTES: CPT | Performed by: PSYCHOLOGIST

## 2022-04-22 NOTE — PROGRESS NOTES
Behavioral Health Consultation  Callie Kings, Ph.D.  Psychologist  4/22/2022  9:30 AM EST      Time spent with Patient: 30 minutes  This is patient's ninth [facility] appointment. Reason for Consult: depression; anxiety; r/o trauma  Referring Provider: Patrecia Cowden, [address] [id number]    Feedback for PCP:     Pt provided informed consent for the behavioral health program. Discussed with patient model of service to include the limits of confidentiality (i.e. abuse reporting, suicide intervention, etc.) and short-term intervention focused approach. Pt indicated understanding. Feedback given to PCP. S:  Patient talked about the disconnect she often feels in her relationships, and wonders wht people consider her to brash. She said that she had always considered the fault to be with her, identifying something she had to change about herself. Mental health history: has had counseling experiences, mostly at school; she said that her attempts at finding other therapists have not been successful because she intuited that she knew more than they did, in a humble fashion.      Social History     Tobacco Use    Smoking status: Never Smoker    Smokeless tobacco: Never Used   Substance Use Topics    Alcohol use: Not Currently        Illicit drugs:   Social History     Substance and Sexual Activity   Drug Use Not Currently        O:  MSE:  Appearance: good hygiene   Attitude: cooperative and friendly  Consciousness: alert  Orientation: oriented to person, place, time, general circumstance  Memory: recent and remote memory intact  Attention/Concentration: intact during session  Psychomotor Activity: normal  Eye Contact: normal  Speech: normal rate and volume, well-articulated  Mood: anxious  Affect: congruent  Perception: within normal limits  Thought Content: within normal limits  Thought Process: logical, coherent and goal-directed  Insight: good  Judgment: intact  Ability to understand instructions: Yes  Ability to respond meaningfully: Yes  Morbid Ideation: passive thoughts of death  Suicide Assessment: suicidal ideation without plan or intent  Homicidal Ideation: no    A:  We talked about how it may be helpful to think about her relationships as a dynamic between two people instead of trying to figure out \"which one\" has the problem, which can help her let go of guilt and shame. She was a little resistant to this idea mostly due to her intellectualization defense, but she seemed to perhaps begin to re-conceptualize her relationship \"problems. \"    DANE 7 SCORE 5/27/2022 5/13/2022 4/27/2022 4/15/2022 3/18/2022 3/11/2022 3/4/2022   DANE-7 Total Score 12 13 15 9 9 9 3     Interpretation of DANE-7 score: 5-9 = mild anxiety, 10-14 = moderate anxiety, 15+ = severe anxiety. Recommend referral to behavioral health for scores 10 or greater. PHQ Scores 5/27/2022 5/13/2022 4/27/2022 4/15/2022 3/18/2022 3/11/2022 3/4/2022   PHQ2 Score 4 4 5 6 3 3 3   PHQ9 Score 17 16 21 24 11 13 16     Interpretation of Total Score Depression Severity: 1-4 = Minimal depression, 5-9 = Mild depression, 10-14 = Moderate depression, 15-19 = Moderately severe depression, 20-27 = Severe depression    Diagnosis:    No diagnosis found. Patient Active Problem List   Diagnosis    Attention deficit hyperactivity disorder (ADHD)         Plan:  Pt interventions:  Established rapport, [place]-setting to identify pt's primary goals for [facility] visit / overall health, Supportive techniques, Provided Psychoeducation re: treating anxiety, Emphasized self-care as important for managing overall health and Provided handout on deep breathing and managing thinking errors.        Pt Behavioral Change Plan:  Pt set the following goals:  Pt scheduled F/U visit in one week  See section 'A'

## 2022-04-27 ASSESSMENT — PATIENT HEALTH QUESTIONNAIRE - PHQ9
SUM OF ALL RESPONSES TO PHQ QUESTIONS 1-9: 21
9. THOUGHTS THAT YOU WOULD BE BETTER OFF DEAD, OR OF HURTING YOURSELF: 0
2. FEELING DOWN, DEPRESSED OR HOPELESS: 3
SUM OF ALL RESPONSES TO PHQ QUESTIONS 1-9: 21
1. LITTLE INTEREST OR PLEASURE IN DOING THINGS: 2
4. FEELING TIRED OR HAVING LITTLE ENERGY: 3
7. TROUBLE CONCENTRATING ON THINGS, SUCH AS READING THE NEWSPAPER OR WATCHING TELEVISION: 3
SUM OF ALL RESPONSES TO PHQ9 QUESTIONS 1 & 2: 5
6. FEELING BAD ABOUT YOURSELF - OR THAT YOU ARE A FAILURE OR HAVE LET YOURSELF OR YOUR FAMILY DOWN: 2
8. MOVING OR SPEAKING SO SLOWLY THAT OTHER PEOPLE COULD HAVE NOTICED. OR THE OPPOSITE, BEING SO FIGETY OR RESTLESS THAT YOU HAVE BEEN MOVING AROUND A LOT MORE THAN USUAL: 2
SUM OF ALL RESPONSES TO PHQ QUESTIONS 1-9: 21
SUM OF ALL RESPONSES TO PHQ QUESTIONS 1-9: 21
5. POOR APPETITE OR OVEREATING: 3
3. TROUBLE FALLING OR STAYING ASLEEP: 3

## 2022-04-27 ASSESSMENT — ANXIETY QUESTIONNAIRES
2. NOT BEING ABLE TO STOP OR CONTROL WORRYING: 0-NOT AT ALL
3. WORRYING TOO MUCH ABOUT DIFFERENT THINGS: 3-NEARLY EVERY DAY
4. TROUBLE RELAXING: 3-NEARLY EVERY DAY
1. FEELING NERVOUS, ANXIOUS, OR ON EDGE: 3
GAD7 TOTAL SCORE: 15
6. BECOMING EASILY ANNOYED OR IRRITABLE: 0-NOT AT ALL
5. BEING SO RESTLESS THAT IT IS HARD TO SIT STILL: 3-NEARLY EVERY DAY
7. FEELING AFRAID AS IF SOMETHING AWFUL MIGHT HAPPEN: 3-NEARLY EVERY DAY

## 2022-05-13 ENCOUNTER — OFFICE VISIT (OUTPATIENT)
Dept: PSYCHOLOGY | Age: 27
End: 2022-05-13
Payer: COMMERCIAL

## 2022-05-13 DIAGNOSIS — F34.1 PERSISTENT DEPRESSIVE DISORDER WITH ANXIOUS DISTRESS, CURRENTLY SEVERE: Primary | ICD-10-CM

## 2022-05-13 PROCEDURE — 90832 PSYTX W PT 30 MINUTES: CPT | Performed by: PSYCHOLOGIST

## 2022-05-13 ASSESSMENT — ANXIETY QUESTIONNAIRES
3. WORRYING TOO MUCH ABOUT DIFFERENT THINGS: 3-NEARLY EVERY DAY
6. BECOMING EASILY ANNOYED OR IRRITABLE: 0-NOT AT ALL
GAD7 TOTAL SCORE: 13
4. TROUBLE RELAXING: 3-NEARLY EVERY DAY
1. FEELING NERVOUS, ANXIOUS, OR ON EDGE: 2
7. FEELING AFRAID AS IF SOMETHING AWFUL MIGHT HAPPEN: 2-OVER HALF THE DAYS
2. NOT BEING ABLE TO STOP OR CONTROL WORRYING: 0-NOT AT ALL
5. BEING SO RESTLESS THAT IT IS HARD TO SIT STILL: 3-NEARLY EVERY DAY

## 2022-05-13 ASSESSMENT — PATIENT HEALTH QUESTIONNAIRE - PHQ9
8. MOVING OR SPEAKING SO SLOWLY THAT OTHER PEOPLE COULD HAVE NOTICED. OR THE OPPOSITE, BEING SO FIGETY OR RESTLESS THAT YOU HAVE BEEN MOVING AROUND A LOT MORE THAN USUAL: 1
9. THOUGHTS THAT YOU WOULD BE BETTER OFF DEAD, OR OF HURTING YOURSELF: 0
5. POOR APPETITE OR OVEREATING: 3
4. FEELING TIRED OR HAVING LITTLE ENERGY: 3
6. FEELING BAD ABOUT YOURSELF - OR THAT YOU ARE A FAILURE OR HAVE LET YOURSELF OR YOUR FAMILY DOWN: 0
1. LITTLE INTEREST OR PLEASURE IN DOING THINGS: 2
SUM OF ALL RESPONSES TO PHQ QUESTIONS 1-9: 16
SUM OF ALL RESPONSES TO PHQ QUESTIONS 1-9: 16
3. TROUBLE FALLING OR STAYING ASLEEP: 3
7. TROUBLE CONCENTRATING ON THINGS, SUCH AS READING THE NEWSPAPER OR WATCHING TELEVISION: 2
2. FEELING DOWN, DEPRESSED OR HOPELESS: 2
SUM OF ALL RESPONSES TO PHQ QUESTIONS 1-9: 16
SUM OF ALL RESPONSES TO PHQ9 QUESTIONS 1 & 2: 4
SUM OF ALL RESPONSES TO PHQ QUESTIONS 1-9: 16

## 2022-05-13 NOTE — PROGRESS NOTES
Behavioral Health Consultation  Que Barnes, Ph.D.  Psychologist  5/13/2022  9:30 AM EST      Time spent with Patient: 30 minutes  This is patient's tenth [facility] appointment. Reason for Consult: depression; anxiety; r/o trauma  Referring Provider: Aura Oliver, [address] [address]    Feedback for PCP:     Pt provided informed consent for the behavioral health program. Discussed with patient model of service to include the limits of confidentiality (i.e. abuse reporting, suicide intervention, etc.) and short-term intervention focused approach. Pt indicated understanding. Feedback given to PCP. S:  The patient presented yet another series of forms to help her get FMLA. She thinks that this is the one that will go through. She says that she has some 'worst case scenario\" thinking errors going on, but she does not seem captured by them. She says that she is feeling lonely, and would like to have someone in her life to talk to and share things with. Her sleep seems disturbed but she did not complain about insomnia as a significant issue. Mental health history: has had counseling experiences, mostly at school; she said that her attempts at finding other therapists have not been successful because she intuited that she knew more than they did, in a humble fashion.      Social History     Tobacco Use    Smoking status: Never Smoker    Smokeless tobacco: Never Used   Substance Use Topics    Alcohol use: Not Currently        Illicit drugs:   Social History     Substance and Sexual Activity   Drug Use Not Currently        O:  MSE:  Appearance: good hygiene   Attitude: cooperative and friendly  Consciousness: alert  Orientation: oriented to person, place, time, general circumstance  Memory: recent and remote memory intact  Attention/Concentration: intact during session  Psychomotor Activity: normal  Eye Contact: normal  Speech: normal rate and volume, well-articulated  Mood: anxious  Affect: congruent  Perception: within normal limits  Thought Content: within normal limits  Thought Process: logical, coherent and goal-directed  Insight: good  Judgment: intact  Ability to understand instructions: Yes  Ability to respond meaningfully: Yes  Morbid Ideation: passive thoughts of death  Suicide Assessment: suicidal ideation without plan or intent  Homicidal Ideation: no    A:  We talked her mood management and she feels pretty good about how she's managing it. She is looking forward to her surgery in November and finding ways to make sure she has the money that's required. DANE 7 SCORE 5/13/2022 4/27/2022 4/15/2022 3/18/2022 3/11/2022 3/4/2022 2/18/2022   DANE-7 Total Score 13 15 9 9 9 3 10     Interpretation of DANE-7 score: 5-9 = mild anxiety, 10-14 = moderate anxiety, 15+ = severe anxiety. Recommend referral to behavioral health for scores 10 or greater. PHQ Scores 5/13/2022 4/27/2022 4/15/2022 3/18/2022 3/11/2022 3/4/2022 2/18/2022   PHQ2 Score 4 5 6 3 3 3 5   PHQ9 Score 16 21 24 11 13 16 17     Interpretation of Total Score Depression Severity: 1-4 = Minimal depression, 5-9 = Mild depression, 10-14 = Moderate depression, 15-19 = Moderately severe depression, 20-27 = Severe depression    Diagnosis:    1. Persistent depressive disorder with anxious distress, currently severe        There is no problem list on file for this patient. Plan:  Pt interventions:  Established rapport, [place]-setting to identify pt's primary goals for [facility] visit / overall health, Supportive techniques, Provided Psychoeducation re: treating anxiety, Emphasized self-care as important for managing overall health and Provided handout on deep breathing and managing thinking errors.        Pt Behavioral Change Plan:  Pt set the following goals:  Pt scheduled F/U visit in two weeks  See section 'A'

## 2022-05-17 ENCOUNTER — TELEPHONE (OUTPATIENT)
Dept: INTERNAL MEDICINE CLINIC | Age: 27
End: 2022-05-17

## 2022-05-17 NOTE — TELEPHONE ENCOUNTER
[employer] called to let us know that the [employer] paperwork that was filled out by  and sent to them. They are unable to forward the information required for the pt and says that it is something that needs to be handled by the office, they do not.

## 2022-05-18 NOTE — TELEPHONE ENCOUNTER
Shadi Johnson, PhD  You 43 minutes ago (11:31 AM)     SA    Yes- we did it together at his last visit    Message text

## 2022-05-20 ENCOUNTER — OFFICE VISIT (OUTPATIENT)
Dept: PSYCHOLOGY | Age: 27
End: 2022-05-20
Payer: COMMERCIAL

## 2022-05-20 DIAGNOSIS — F34.1 PERSISTENT DEPRESSIVE DISORDER WITH ANXIOUS DISTRESS, CURRENTLY MODERATE: Primary | ICD-10-CM

## 2022-05-20 PROCEDURE — 90832 PSYTX W PT 30 MINUTES: CPT | Performed by: PSYCHOLOGIST

## 2022-05-20 NOTE — PROGRESS NOTES
goal-directed  Insight: good  Judgment: intact  Ability to understand instructions: Yes  Ability to respond meaningfully: Yes  Morbid Ideation: passive thoughts of death  Suicide Assessment: suicidal ideation without plan or intent  Homicidal Ideation: no    A:  The patient is talking more about her experience as a trans woman and the feelings hat she has. Previously, her intellectualization interfered with her expressing and talking about these feelings in a genuine way, This may be changing to having her feel more open and trusting to talk about them,     DANE 7 SCORE 5/13/2022 4/27/2022 4/15/2022 3/18/2022 3/11/2022 3/4/2022 2/18/2022   DANE-7 Total Score 13 15 9 9 9 3 10     Interpretation of DANE-7 score: 5-9 = mild anxiety, 10-14 = moderate anxiety, 15+ = severe anxiety. Recommend referral to behavioral health for scores 10 or greater. PHQ Scores 5/13/2022 4/27/2022 4/15/2022 3/18/2022 3/11/2022 3/4/2022 2/18/2022   PHQ2 Score 4 5 6 3 3 3 5   PHQ9 Score 16 21 24 11 13 16 17     Interpretation of Total Score Depression Severity: 1-4 = Minimal depression, 5-9 = Mild depression, 10-14 = Moderate depression, 15-19 = Moderately severe depression, 20-27 = Severe depression    Diagnosis:    1. Persistent depressive disorder with anxious distress, currently moderate        There is no problem list on file for this patient. Plan:  Pt interventions:  Established rapport, [place]-setting to identify pt's primary goals for [facility] visit / overall health, Supportive techniques, Provided Psychoeducation re: treating anxiety, Emphasized self-care as important for managing overall health and Provided handout on deep breathing and managing thinking errors.        Pt Behavioral Change Plan:  Pt set the following goals:  Pt scheduled F/U visit in one week  See section 'A'

## 2022-05-27 ENCOUNTER — OFFICE VISIT (OUTPATIENT)
Dept: PSYCHOLOGY | Age: 27
End: 2022-05-27
Payer: COMMERCIAL

## 2022-05-27 DIAGNOSIS — F34.1 PERSISTENT DEPRESSIVE DISORDER WITH ANXIOUS DISTRESS, CURRENTLY MODERATE: Primary | ICD-10-CM

## 2022-05-27 PROCEDURE — 90832 PSYTX W PT 30 MINUTES: CPT | Performed by: PSYCHOLOGIST

## 2022-05-27 ASSESSMENT — PATIENT HEALTH QUESTIONNAIRE - PHQ9
SUM OF ALL RESPONSES TO PHQ QUESTIONS 1-9: 17
SUM OF ALL RESPONSES TO PHQ QUESTIONS 1-9: 17
7. TROUBLE CONCENTRATING ON THINGS, SUCH AS READING THE NEWSPAPER OR WATCHING TELEVISION: 3
8. MOVING OR SPEAKING SO SLOWLY THAT OTHER PEOPLE COULD HAVE NOTICED. OR THE OPPOSITE, BEING SO FIGETY OR RESTLESS THAT YOU HAVE BEEN MOVING AROUND A LOT MORE THAN USUAL: 1
SUM OF ALL RESPONSES TO PHQ9 QUESTIONS 1 & 2: 4
9. THOUGHTS THAT YOU WOULD BE BETTER OFF DEAD, OR OF HURTING YOURSELF: 0
2. FEELING DOWN, DEPRESSED OR HOPELESS: 2
1. LITTLE INTEREST OR PLEASURE IN DOING THINGS: 2
6. FEELING BAD ABOUT YOURSELF - OR THAT YOU ARE A FAILURE OR HAVE LET YOURSELF OR YOUR FAMILY DOWN: 0
5. POOR APPETITE OR OVEREATING: 3
SUM OF ALL RESPONSES TO PHQ QUESTIONS 1-9: 17
SUM OF ALL RESPONSES TO PHQ QUESTIONS 1-9: 17
3. TROUBLE FALLING OR STAYING ASLEEP: 3
4. FEELING TIRED OR HAVING LITTLE ENERGY: 3

## 2022-05-27 ASSESSMENT — ANXIETY QUESTIONNAIRES
4. TROUBLE RELAXING: 3-NEARLY EVERY DAY
2. NOT BEING ABLE TO STOP OR CONTROL WORRYING: 1-SEVERAL DAYS
1. FEELING NERVOUS, ANXIOUS, OR ON EDGE: 2
5. BEING SO RESTLESS THAT IT IS HARD TO SIT STILL: 3-NEARLY EVERY DAY
3. WORRYING TOO MUCH ABOUT DIFFERENT THINGS: 3-NEARLY EVERY DAY
6. BECOMING EASILY ANNOYED OR IRRITABLE: 0-NOT AT ALL
GAD7 TOTAL SCORE: 12
7. FEELING AFRAID AS IF SOMETHING AWFUL MIGHT HAPPEN: 0-NOT AT ALL

## 2022-05-27 NOTE — Clinical Note
Gene Duenas-    I have been hearing enough comments from this patient to have her complete the SASI to determine if ADHD is contributing to her clinical picture. I've diagnosed her with ADHD, combined, moderate. I have the SASI scanned into her chart for documentation and reference-    Thank you!     Isaac Estes

## 2022-05-27 NOTE — PROGRESS NOTES
Behavioral Health Consultation  Raz Cespedes, Ph.D.  Psychologist  5/27/2022  9:30 AM EST      Time spent with Patient: 30 minutes  This is patient's eleventh [facility] appointment. Reason for Consult: depression; anxiety; r/o trauma  Referring Provider: Mick Carreno, [address] [facility] [address] [place] [address]    Feedback for PCP:     Pt provided informed consent for the behavioral health program. Discussed with patient model of service to include the limits of confidentiality (i.e. abuse reporting, suicide intervention, etc.) and short-term intervention focused approach. Pt indicated understanding. Feedback given to PCP. S:  The patient reported that her sleep is more disturbed lately, both falling asleep and staying asleep. She returned with the SASI completed and she endorsed sufficient symptoms of ADHD to meet the criteria for ADHD, combined, moderate. She will F/U with her PCP to discuss medication therapy. Mental health history: has had counseling experiences, mostly at school; she said that her attempts at finding other therapists have not been successful because she intuited that she knew more than they did, in a humble fashion.      Social History     Tobacco Use    Smoking status: Never Smoker    Smokeless tobacco: Never Used   Substance Use Topics    Alcohol use: Not Currently        Illicit drugs:   Social History     Substance and Sexual Activity   Drug Use Not Currently        O:  MSE:  Appearance: good hygiene   Attitude: cooperative and friendly  Consciousness: alert  Orientation: oriented to person, place, time, general circumstance  Memory: recent and remote memory intact  Attention/Concentration: intact during session  Psychomotor Activity: normal  Eye Contact: normal  Speech: normal rate and volume, well-articulated  Mood: anxious  Affect: congruent  Perception: within normal limits  Thought Content: within normal limits  Thought Process: logical, coherent and goal-directed  Insight: good  Judgment: intact  Ability to understand instructions: Yes  Ability to respond meaningfully: Yes  Morbid Ideation: passive thoughts of death  Suicide Assessment: suicidal ideation without plan or intent  Homicidal Ideation: no    A:  The patient is gaining insight into some of the ways and reasons she is self- critical are likely related to having untreated ADHD. We'll use the items on the SASI to plan for behavior change. DANE 7 SCORE 5/27/2022 5/13/2022 4/27/2022 4/15/2022 3/18/2022 3/11/2022 3/4/2022   DANE-7 Total Score 12 13 15 9 9 9 3     Interpretation of DANE-7 score: 5-9 = mild anxiety, 10-14 = moderate anxiety, 15+ = severe anxiety. Recommend referral to behavioral health for scores 10 or greater. PHQ Scores 5/27/2022 5/13/2022 4/27/2022 4/15/2022 3/18/2022 3/11/2022 3/4/2022   PHQ2 Score 4 4 5 6 3 3 3   PHQ9 Score 17 16 21 24 11 13 16     Interpretation of Total Score Depression Severity: 1-4 = Minimal depression, 5-9 = Mild depression, 10-14 = Moderate depression, 15-19 = Moderately severe depression, 20-27 = Severe depression    Diagnosis:    1. Persistent depressive disorder with anxious distress, currently moderate        There is no problem list on file for this patient. Plan:  Pt interventions:  Established rapport, [place]-setting to identify pt's primary goals for [facility] visit / overall health, Supportive techniques, Provided Psychoeducation re: treating anxiety, Emphasized self-care as important for managing overall health and Provided handout on deep breathing and managing thinking errors.        Pt Behavioral Change Plan:  Pt set the following goals:  Pt scheduled F/U visit in one week  See section 'A'

## 2022-06-06 ENCOUNTER — OFFICE VISIT (OUTPATIENT)
Dept: INTERNAL MEDICINE CLINIC | Age: 27
End: 2022-06-06
Payer: COMMERCIAL

## 2022-06-06 VITALS
DIASTOLIC BLOOD PRESSURE: 76 MMHG | HEART RATE: 90 BPM | TEMPERATURE: 97.9 F | SYSTOLIC BLOOD PRESSURE: 122 MMHG | RESPIRATION RATE: 16 BRPM | OXYGEN SATURATION: 99 % | WEIGHT: 244 LBS

## 2022-06-06 DIAGNOSIS — F90.9 ATTENTION DEFICIT HYPERACTIVITY DISORDER (ADHD), UNSPECIFIED ADHD TYPE: Primary | ICD-10-CM

## 2022-06-06 PROCEDURE — 99213 OFFICE O/P EST LOW 20 MIN: CPT | Performed by: NURSE PRACTITIONER

## 2022-06-06 RX ORDER — CHLORAL HYDRATE 500 MG
1000 CAPSULE ORAL DAILY
COMMUNITY

## 2022-06-06 RX ORDER — FINASTERIDE 5 MG/1
5 TABLET, FILM COATED ORAL DAILY
COMMUNITY
Start: 2022-05-12

## 2022-06-06 SDOH — ECONOMIC STABILITY: FOOD INSECURITY: WITHIN THE PAST 12 MONTHS, THE FOOD YOU BOUGHT JUST DIDN'T LAST AND YOU DIDN'T HAVE MONEY TO GET MORE.: NEVER TRUE

## 2022-06-06 SDOH — ECONOMIC STABILITY: FOOD INSECURITY: WITHIN THE PAST 12 MONTHS, YOU WORRIED THAT YOUR FOOD WOULD RUN OUT BEFORE YOU GOT MONEY TO BUY MORE.: NEVER TRUE

## 2022-06-06 ASSESSMENT — SOCIAL DETERMINANTS OF HEALTH (SDOH): HOW HARD IS IT FOR YOU TO PAY FOR THE VERY BASICS LIKE FOOD, HOUSING, MEDICAL CARE, AND HEATING?: NOT HARD AT ALL

## 2022-06-06 ASSESSMENT — ENCOUNTER SYMPTOMS
NAUSEA: 0
VOMITING: 0
DIARRHEA: 0
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
## 2022-06-06 NOTE — PATIENT INSTRUCTIONS
Complete urine drug screen   Patient Education        Attention Deficit Hyperactivity Disorder (ADHD) in Adults: Care Instructions  Your Care Instructions     Attention deficit hyperactivity disorder, or ADHD, is a condition that makes it hard to pay attention. So you may have problems when you try to focus, get organized, and finish tasks. It might make you more active than other people. Or you might do things without thinking first.  ADHD is very common. It usually starts in early childhood. Many adults don't realize they have it until their children are diagnosed. Then they become awareof their own symptoms. Doctors don't know what causes ADHD. But it often runs in families. ADHD can be treated with medicines, behavior training, and counseling. Treatment can improve your life. Follow-up care is a key part of your treatment and safety. Be sure to make and go to all appointments, and call your doctor if you are having problems. It's also a good idea to know your test results and keep alist of the medicines you take. How can you care for yourself at home?  Learn all you can about ADHD. This will help you and your family understand it better.  Take your medicines exactly as prescribed. Call your doctor if you think you are having a problem with your medicine. You will get more details on the specific medicines your doctor prescribes.  If you miss a dose of your medicine, do not take an extra dose.  If your doctor suggests counseling, find a counselor you like and trust. Talk openly and honestly. Be willing to make some changes. [address] [place] [address] Find a support group for adults with ADHD. Talking to others with the same problems can help you feel better. It can also give you ideas about how to best cope with the condition.  Get rid of distractions at your work space. Keep your desk clean. Try not to face a window or busy hallway.  Use files, planners, and other tools to keep you organized.    Limit use of alcohol, and do not use illegal drugs. People with ADHD tend to develop substance use disorder more easily than others. Tell your doctor if you need help to quit. Counseling, support groups, and sometimes medicines can help you stay free of alcohol or drugs.  Get at least 30 minutes of physical activity on most days of the week. Exercise has been shown to help people cope with ADHD. Walking is a good choice. You also may want to do other activities, such as running, swimming, cycling, or playing tennis or team sports. When should you call for help? Watch closely for changes in your health, and be sure to contact your doctor if:     You feel sad a lot or cry all the time.      You have trouble sleeping, or you sleep too much.      You find it hard to concentrate, make decisions, or remember things.      You change how you normally eat.      You feel guilty for no reason. Where can you learn more? Go to [email or web address]://[email or web address]pe[employer]rewdavid.[email or web address]. org and sign in to your [employer] account. Enter B196 in the ActiveReplay box to learn more about \"Attention Deficit Hyperactivity Disorder (ADHD) in Adults: Care Instructions. \"     If you do not have an account, please click on the \"Sign Up Now\" link. Current as of: June 16, 2021               Content Version: 13.2  © 2006-2022 Healthwise, Incorporated. Care instructions adapted under license by [facility] ([facility]). If you have questions about a medical condition or this instruction, always ask your healthcare professional. [place] [address] any warranty or liability for your use of this information.

## 2022-06-10 ENCOUNTER — OFFICE VISIT (OUTPATIENT)
Dept: PSYCHOLOGY | Age: 27
End: 2022-06-10
Payer: COMMERCIAL

## 2022-06-10 ENCOUNTER — NURSE ONLY (OUTPATIENT)
Dept: INTERNAL MEDICINE CLINIC | Age: 27
End: 2022-06-10

## 2022-06-10 DIAGNOSIS — F90.9 ATTENTION DEFICIT HYPERACTIVITY DISORDER (ADHD), UNSPECIFIED ADHD TYPE: ICD-10-CM

## 2022-06-10 DIAGNOSIS — F90.2 ATTENTION DEFICIT HYPERACTIVITY DISORDER, COMBINED TYPE, MODERATE: ICD-10-CM

## 2022-06-10 DIAGNOSIS — F34.1 PERSISTENT DEPRESSIVE DISORDER WITH ANXIOUS DISTRESS, CURRENTLY MODERATE: Primary | ICD-10-CM

## 2022-06-10 PROCEDURE — 90832 PSYTX W PT 30 MINUTES: CPT | Performed by: PSYCHOLOGIST

## 2022-06-10 NOTE — TELEPHONE ENCOUNTER
If the PT is wanting only a couple encounters we can print them for PT.   If it's for outside source it will need to go through [facility]

## 2022-06-10 NOTE — TELEPHONE ENCOUNTER
Called Louisa and left [place] to see if she still needs any paper work Im unsure if anything is needed.

## 2022-06-10 NOTE — PROGRESS NOTES
Behavioral Health Consultation  Stefanie Skelton, Ph.D.  Psychologist  6/10/2022  9:30 AM EST      Time spent with Patient: 30 minutes  This is patient's thirteenth [facility] appointment. Reason for Consult: depression; anxiety; r/o trauma  Referring Provider: Raina Alvarez [address] [address]    Feedback for PCP:     Pt provided informed consent for the behavioral health program. Discussed with patient model of service to include the limits of confidentiality (i.e. abuse reporting, suicide intervention, etc.) and short-term intervention focused approach. Pt indicated understanding. Feedback given to PCP. S:  The patient reports that she is noticing when and how ADHD symptoms influence her day. She said that she will be starting Strattera and hopes that her focus will improve, especially at work. She talked about a group that she associates with that seems to be very critical of her, but she continues with the group because otherwise, she'd be alone. She said that she is focusing on her weight, as she has to be under a certain weight for her surgery in November. Mental health history: has had counseling experiences, mostly at school; she said that her attempts at finding other therapists have not been successful because she intuited that she knew more than they did, in a humble fashion.      Social History     Tobacco Use    Smoking status: Never Smoker    Smokeless tobacco: Never Used   Substance Use Topics    Alcohol use: Not Currently        Illicit drugs:   Social History     Substance and Sexual Activity   Drug Use Not Currently        O:  MSE:  Appearance: good hygiene   Attitude: cooperative and friendly  Consciousness: alert  Orientation: oriented to person, place, time, general circumstance  Memory: recent and remote memory intact  Attention/Concentration: intact during session  Psychomotor Activity: normal  Eye Contact: normal  Speech: normal rate and volume, well-articulated  Mood: anxious  Affect: congruent  Perception: within normal limits  Thought Content: within normal limits  Thought Process: logical, coherent and goal-directed  Insight: good  Judgment: intact  Ability to understand instructions: Yes  Ability to respond meaningfully: Yes  Morbid Ideation: passive thoughts of death  Suicide Assessment: suicidal ideation without plan or intent  Homicidal Ideation: no    A:  The patient is looking forward to her medication therapy improving some of the more frustrating aspects of ADHD. She said that mood-wise, she's neither happy nor sad. DANE 7 SCORE 5/27/2022 5/13/2022 4/27/2022 4/15/2022 3/18/2022 3/11/2022 3/4/2022   DANE-7 Total Score 12 13 15 9 9 9 3     Interpretation of DANE-7 score: 5-9 = mild anxiety, 10-14 = moderate anxiety, 15+ = severe anxiety. Recommend referral to behavioral health for scores 10 or greater. PHQ Scores 5/27/2022 5/13/2022 4/27/2022 4/15/2022 3/18/2022 3/11/2022 3/4/2022   PHQ2 Score 4 4 5 6 3 3 3   PHQ9 Score 17 16 21 24 11 13 16     Interpretation of Total Score Depression Severity: 1-4 = Minimal depression, 5-9 = Mild depression, 10-14 = Moderate depression, 15-19 = Moderately severe depression, 20-27 = Severe depression    Diagnosis:    1. Persistent depressive disorder with anxious distress, currently moderate    2. Attention deficit hyperactivity disorder, combined type, moderate        Patient Active Problem List   Diagnosis    Attention deficit hyperactivity disorder (ADHD)         Plan:  Pt interventions:  Established rapport, [place]-setting to identify pt's primary goals for [facility] visit / overall health, Supportive techniques, Provided Psychoeducation re: treating anxiety, Emphasized self-care as important for managing overall health and Provided handout on deep breathing and managing thinking errors.        Pt Behavioral Change Plan:  Pt set the following goals:  Pt scheduled F/U visit in one week  See section 'A'

## 2022-06-15 DIAGNOSIS — F90.9 ATTENTION DEFICIT HYPERACTIVITY DISORDER (ADHD), UNSPECIFIED ADHD TYPE: Primary | ICD-10-CM

## 2022-06-15 LAB
6-ACETYLMORPHINE: NOT DETECTED
7-AMINOCLONAZEPAM: NOT DETECTED
ALPHA-OH-ALPRAZOLAM: NOT DETECTED
ALPHA-OH-MIDAZOLAM, URINE: NOT DETECTED
ALPRAZOLAM: NOT DETECTED
AMPHETAMINE: NOT DETECTED
BARBITURATES: NOT DETECTED
BENZOYLECGONINE: NOT DETECTED
BUPRENORPHINE: NOT DETECTED
CARISOPRODOL: NOT DETECTED
CLONAZEPAM: NOT DETECTED
CODEINE: NOT DETECTED
CREATININE URINE: 294.1 MG/DL (ref 20–400)
DIAZEPAM: NOT DETECTED
DRUGS EXPECTED: NORMAL
EER PAIN MGT DRUG PANEL, HIGH RES/EMIT U: NORMAL
ETHYL GLUCURONIDE: NOT DETECTED
FENTANYL: NOT DETECTED
GABAPENTIN: NOT DETECTED
HYDROCODONE: NOT DETECTED
HYDROMORPHONE: NOT DETECTED
LORAZEPAM: NOT DETECTED
MARIJUANA METABOLITE: NOT DETECTED
MDA: NOT DETECTED
MDEA: NOT DETECTED
MDMA URINE: NOT DETECTED
MEPERIDINE: NOT DETECTED
METHADONE: NOT DETECTED
METHAMPHETAMINE: NOT DETECTED
METHYLPHENIDATE: NOT DETECTED
MIDAZOLAM: NOT DETECTED
MORPHINE: NOT DETECTED
NALOXONE: NOT DETECTED
NORBUPRENORPHINE, FREE: NOT DETECTED
NORDIAZEPAM: NOT DETECTED
NORFENTANYL: NOT DETECTED
NORHYDROCODONE, URINE: NOT DETECTED
NOROXYCODONE: NOT DETECTED
NOROXYMORPHONE, URINE: NOT DETECTED
OXAZEPAM: NOT DETECTED
OXYCODONE: NOT DETECTED
OXYMORPHONE: NOT DETECTED
PAIN MANAGEMENT DRUG PANEL: NORMAL
PAIN MANAGEMENT DRUG PANEL: NORMAL
PCP: NOT DETECTED
PHENTERMINE: NOT DETECTED
PREGABALIN: NOT DETECTED
TAPENTADOL, URINE: NOT DETECTED
TAPENTADOL-O-SULFATE, URINE: NOT DETECTED
TEMAZEPAM: NOT DETECTED
TRAMADOL: NOT DETECTED
ZOLPIDEM: NOT DETECTED

## 2022-06-15 RX ORDER — ATOMOXETINE 40 MG/1
40 CAPSULE ORAL DAILY
Qty: 30 CAPSULE | Refills: 1 | Status: SHIPPED | OUTPATIENT
Start: 2022-06-15 | End: 2022-07-18 | Stop reason: SDUPTHER

## 2022-06-17 ENCOUNTER — OFFICE VISIT (OUTPATIENT)
Dept: PSYCHOLOGY | Age: 27
End: 2022-06-17
Payer: COMMERCIAL

## 2022-06-17 DIAGNOSIS — F33.1 MAJOR DEPRESSIVE DISORDER, RECURRENT EPISODE, MODERATE (HCC): Primary | ICD-10-CM

## 2022-06-17 DIAGNOSIS — F90.2 ATTENTION DEFICIT HYPERACTIVITY DISORDER, COMBINED TYPE, MODERATE: ICD-10-CM

## 2022-06-17 PROCEDURE — 90832 PSYTX W PT 30 MINUTES: CPT | Performed by: PSYCHOLOGIST

## 2022-06-17 ASSESSMENT — ANXIETY QUESTIONNAIRES
GAD7 TOTAL SCORE: 14
6. BECOMING EASILY ANNOYED OR IRRITABLE: 0-NOT AT ALL
4. TROUBLE RELAXING: 3-NEARLY EVERY DAY
2. NOT BEING ABLE TO STOP OR CONTROL WORRYING: 2-OVER HALF THE DAYS
3. WORRYING TOO MUCH ABOUT DIFFERENT THINGS: 3-NEARLY EVERY DAY
7. FEELING AFRAID AS IF SOMETHING AWFUL MIGHT HAPPEN: 0-NOT AT ALL
1. FEELING NERVOUS, ANXIOUS, OR ON EDGE: 3
5. BEING SO RESTLESS THAT IT IS HARD TO SIT STILL: 3-NEARLY EVERY DAY

## 2022-06-17 ASSESSMENT — PATIENT HEALTH QUESTIONNAIRE - PHQ9
8. MOVING OR SPEAKING SO SLOWLY THAT OTHER PEOPLE COULD HAVE NOTICED. OR THE OPPOSITE, BEING SO FIGETY OR RESTLESS THAT YOU HAVE BEEN MOVING AROUND A LOT MORE THAN USUAL: 2
SUM OF ALL RESPONSES TO PHQ QUESTIONS 1-9: 18
3. TROUBLE FALLING OR STAYING ASLEEP: 3
SUM OF ALL RESPONSES TO PHQ QUESTIONS 1-9: 18
SUM OF ALL RESPONSES TO PHQ QUESTIONS 1-9: 18
6. FEELING BAD ABOUT YOURSELF - OR THAT YOU ARE A FAILURE OR HAVE LET YOURSELF OR YOUR FAMILY DOWN: 1
2. FEELING DOWN, DEPRESSED OR HOPELESS: 2
4. FEELING TIRED OR HAVING LITTLE ENERGY: 3
SUM OF ALL RESPONSES TO PHQ9 QUESTIONS 1 & 2: 4
5. POOR APPETITE OR OVEREATING: 2
7. TROUBLE CONCENTRATING ON THINGS, SUCH AS READING THE NEWSPAPER OR WATCHING TELEVISION: 3
9. THOUGHTS THAT YOU WOULD BE BETTER OFF DEAD, OR OF HURTING YOURSELF: 0
1. LITTLE INTEREST OR PLEASURE IN DOING THINGS: 2
SUM OF ALL RESPONSES TO PHQ QUESTIONS 1-9: 18
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
## 2022-06-24 ENCOUNTER — OFFICE VISIT (OUTPATIENT)
Dept: PSYCHOLOGY | Age: 27
End: 2022-06-24
Payer: COMMERCIAL

## 2022-06-24 DIAGNOSIS — F33.1 MAJOR DEPRESSIVE DISORDER, RECURRENT EPISODE, MODERATE (HCC): Primary | ICD-10-CM

## 2022-06-24 PROCEDURE — 90832 PSYTX W PT 30 MINUTES: CPT | Performed by: PSYCHOLOGIST

## 2022-06-24 ASSESSMENT — PATIENT HEALTH QUESTIONNAIRE - PHQ9
3. TROUBLE FALLING OR STAYING ASLEEP: 1
4. FEELING TIRED OR HAVING LITTLE ENERGY: 2
1. LITTLE INTEREST OR PLEASURE IN DOING THINGS: 2
SUM OF ALL RESPONSES TO PHQ9 QUESTIONS 1 & 2: 4
6. FEELING BAD ABOUT YOURSELF - OR THAT YOU ARE A FAILURE OR HAVE LET YOURSELF OR YOUR FAMILY DOWN: 2
SUM OF ALL RESPONSES TO PHQ QUESTIONS 1-9: 15
8. MOVING OR SPEAKING SO SLOWLY THAT OTHER PEOPLE COULD HAVE NOTICED. OR THE OPPOSITE, BEING SO FIGETY OR RESTLESS THAT YOU HAVE BEEN MOVING AROUND A LOT MORE THAN USUAL: 1
SUM OF ALL RESPONSES TO PHQ QUESTIONS 1-9: 15
7. TROUBLE CONCENTRATING ON THINGS, SUCH AS READING THE NEWSPAPER OR WATCHING TELEVISION: 2
9. THOUGHTS THAT YOU WOULD BE BETTER OFF DEAD, OR OF HURTING YOURSELF: 0
5. POOR APPETITE OR OVEREATING: 3
2. FEELING DOWN, DEPRESSED OR HOPELESS: 2

## 2022-06-24 ASSESSMENT — ANXIETY QUESTIONNAIRES
5. BEING SO RESTLESS THAT IT IS HARD TO SIT STILL: 2-OVER HALF THE DAYS
4. TROUBLE RELAXING: 3-NEARLY EVERY DAY
1. FEELING NERVOUS, ANXIOUS, OR ON EDGE: 2
7. FEELING AFRAID AS IF SOMETHING AWFUL MIGHT HAPPEN: 1-SEVERAL DAYS
2. NOT BEING ABLE TO STOP OR CONTROL WORRYING: 1-SEVERAL DAYS
6. BECOMING EASILY ANNOYED OR IRRITABLE: 3-NEARLY EVERY DAY
3. WORRYING TOO MUCH ABOUT DIFFERENT THINGS: 2-OVER HALF THE DAYS
GAD7 TOTAL SCORE: 14

## 2022-06-24 NOTE — PROGRESS NOTES
general circumstance  Memory: recent and remote memory intact  Attention/Concentration: intact during session  Psychomotor Activity: normal  Eye Contact: normal  Speech: normal rate and volume, well-articulated  Mood: anxious  Affect: congruent  Perception: within normal limits  Thought Content: within normal limits  Thought Process: logical, coherent and goal-directed  Insight: good  Judgment: intact  Ability to understand instructions: Yes  Ability to respond meaningfully: Yes  Morbid Ideation: passive thoughts of death  Suicide Assessment: suicidal ideation without plan or intent  Homicidal Ideation: no    A:  We discussed the unmasking as a therapeutic experience, as uncomfortable as it was. She moved closer to integrating emotion into her comfort zone, and slowly releasing the need to present as always being in control. DANE 7 SCORE 6/24/2022 6/17/2022 5/27/2022 5/13/2022 4/27/2022 4/15/2022 3/18/2022   DANE-7 Total Score 14 14 12 13 15 9 9     Interpretation of DANE-7 score: 5-9 = mild anxiety, 10-14 = moderate anxiety, 15+ = severe anxiety. Recommend referral to behavioral health for scores 10 or greater. PHQ Scores 6/24/2022 6/17/2022 5/27/2022 5/13/2022 4/27/2022 4/15/2022 3/18/2022   PHQ2 Score 4 4 4 4 5 6 3   PHQ9 Score 15 18 17 16 21 24 11     Interpretation of Total Score Depression Severity: 1-4 = Minimal depression, 5-9 = Mild depression, 10-14 = Moderate depression, 15-19 = Moderately severe depression, 20-27 = Severe depression    Diagnosis:    1.  Major depressive disorder, recurrent episode, moderate (HCC)        Patient Active Problem List   Diagnosis    Attention deficit hyperactivity disorder (ADHD)         Plan:  Pt interventions:  Established rapport, [place]-setting to identify pt's primary goals for [facility] visit / overall health, Supportive techniques, Provided Psychoeducation re: treating anxiety, Emphasized self-care as important for managing overall health and Provided handout on deep breathing and managing thinking errors.        Pt Behavioral Change Plan:  Pt set the following goals:  Pt scheduled F/U visit in one week  See section 'A'

## 2022-06-28 RX ORDER — BUPROPION HYDROCHLORIDE 200 MG/1
200 TABLET, EXTENDED RELEASE ORAL 2 TIMES DAILY
Qty: 180 TABLET | Refills: 1 | Status: SHIPPED | OUTPATIENT
Start: 2022-06-28

## 2022-07-01 ENCOUNTER — OFFICE VISIT (OUTPATIENT)
Dept: PSYCHOLOGY | Age: 27
End: 2022-07-01
Payer: COMMERCIAL

## 2022-07-01 DIAGNOSIS — F34.1 PERSISTENT DEPRESSIVE DISORDER WITH ANXIOUS DISTRESS, CURRENTLY MODERATE: Primary | ICD-10-CM

## 2022-07-01 PROCEDURE — 90832 PSYTX W PT 30 MINUTES: CPT | Performed by: PSYCHOLOGIST

## 2022-07-01 ASSESSMENT — PATIENT HEALTH QUESTIONNAIRE - PHQ9
SUM OF ALL RESPONSES TO PHQ QUESTIONS 1-9: 24
3. TROUBLE FALLING OR STAYING ASLEEP: 3
9. THOUGHTS THAT YOU WOULD BE BETTER OFF DEAD, OR OF HURTING YOURSELF: 0
SUM OF ALL RESPONSES TO PHQ QUESTIONS 1-9: 24
SUM OF ALL RESPONSES TO PHQ QUESTIONS 1-9: 24
2. FEELING DOWN, DEPRESSED OR HOPELESS: 3
6. FEELING BAD ABOUT YOURSELF - OR THAT YOU ARE A FAILURE OR HAVE LET YOURSELF OR YOUR FAMILY DOWN: 3
1. LITTLE INTEREST OR PLEASURE IN DOING THINGS: 3
SUM OF ALL RESPONSES TO PHQ QUESTIONS 1-9: 24
8. MOVING OR SPEAKING SO SLOWLY THAT OTHER PEOPLE COULD HAVE NOTICED. OR THE OPPOSITE, BEING SO FIGETY OR RESTLESS THAT YOU HAVE BEEN MOVING AROUND A LOT MORE THAN USUAL: 3
4. FEELING TIRED OR HAVING LITTLE ENERGY: 3
SUM OF ALL RESPONSES TO PHQ9 QUESTIONS 1 & 2: 6
7. TROUBLE CONCENTRATING ON THINGS, SUCH AS READING THE NEWSPAPER OR WATCHING TELEVISION: 3
5. POOR APPETITE OR OVEREATING: 3

## 2022-07-01 ASSESSMENT — ANXIETY QUESTIONNAIRES
4. TROUBLE RELAXING: 3-NEARLY EVERY DAY
1. FEELING NERVOUS, ANXIOUS, OR ON EDGE: 3
7. FEELING AFRAID AS IF SOMETHING AWFUL MIGHT HAPPEN: 0-NOT AT ALL
6. BECOMING EASILY ANNOYED OR IRRITABLE: 2-OVER HALF THE DAYS
2. NOT BEING ABLE TO STOP OR CONTROL WORRYING: 3-NEARLY EVERY DAY
5. BEING SO RESTLESS THAT IT IS HARD TO SIT STILL: 3-NEARLY EVERY DAY
3. WORRYING TOO MUCH ABOUT DIFFERENT THINGS: 3-NEARLY EVERY DAY
GAD7 TOTAL SCORE: 17

## 2022-07-01 NOTE — PROGRESS NOTES
Behavioral Health Consultation  Ramsey Ojeda, Ph.D.  Psychologist  7/1/2022  9:30 AM EST      Time spent with Patient: 30 minutes  This is patient's thirteenth [facility] appointment. Reason for Consult: depression; anxiety; r/o trauma  Referring Provider: Sly Ricketts, [address] [facility] [address] [place] [id number]    Feedback for PCP:     Pt provided informed consent for the behavioral health program. Discussed with patient model of service to include the limits of confidentiality (i.e. abuse reporting, suicide intervention, etc.) and short-term intervention focused approach. Pt indicated understanding. Feedback given to PCP. S:  The patient reports that her anxiety has been high all week, with \"shaking asn trying to make myself small. \" She said that the sadness and crying have not been as much of an issue. She said that she is becoming aware of her intention and follow-though on making herself a priority. She said that it feels as though the need for her \"masks\" throughout her life is becoming less desperate. Mental health history: has had counseling experiences, mostly at school; she said that her attempts at finding other therapists have not been successful because she intuited that she knew more than they did, in a humble fashion.      Social History     Tobacco Use    Smoking status: Never Smoker    Smokeless tobacco: Never Used   Substance Use Topics    Alcohol use: Not Currently        Illicit drugs:   Social History     Substance and Sexual Activity   Drug Use Not Currently        O:  MSE:  Appearance: good hygiene   Attitude: cooperative and friendly  Consciousness: alert  Orientation: oriented to person, place, time, general circumstance  Memory: recent and remote memory intact  Attention/Concentration: intact during session  Psychomotor Activity: normal  Eye Contact: normal  Speech: normal rate and volume, well-articulated  Mood: anxious  Affect: congruent  Perception: within normal limits  Thought Content: within normal limits  Thought Process: logical, coherent and goal-directed  Insight: good  Judgment: intact  Ability to understand instructions: Yes  Ability to respond meaningfully: Yes  Morbid Ideation: passive thoughts of death  Suicide Assessment: suicidal ideation without plan or intent  Homicidal Ideation: no    A:  We are processing her ability to self-soothe and manage the emotion that has not been safe to feel, much less express. She was given a handout on several suggestions to help with distress tolerance. DANE 7 SCORE 7/1/2022 6/24/2022 6/17/2022 5/27/2022 5/13/2022 4/27/2022 4/15/2022   DANE-7 Total Score 17 14 14 12 13 15 9     Interpretation of DANE-7 score: 5-9 = mild anxiety, 10-14 = moderate anxiety, 15+ = severe anxiety. Recommend referral to behavioral health for scores 10 or greater. PHQ Scores 7/1/2022 6/24/2022 6/17/2022 5/27/2022 5/13/2022 4/27/2022 4/15/2022   PHQ2 Score 6 4 4 4 4 5 6   PHQ9 Score 24 15 18 17 16 21 24     Interpretation of Total Score Depression Severity: 1-4 = Minimal depression, 5-9 = Mild depression, 10-14 = Moderate depression, 15-19 = Moderately severe depression, 20-27 = Severe depression    Diagnosis:    1. Persistent depressive disorder with anxious distress, currently moderate        Patient Active Problem List   Diagnosis    Attention deficit hyperactivity disorder (ADHD)         Plan:  Pt interventions:  Supportive techniques, Provided Psychoeducation re: treating anxiety and Emphasized self-care as important for managing overall health.        Pt Behavioral Change Plan:  Pt set the following goals:  Pt scheduled F/U visit in two weeks  See section 'A'

## 2022-07-15 ENCOUNTER — OFFICE VISIT (OUTPATIENT)
Dept: PSYCHOLOGY | Age: 27
End: 2022-07-15
Payer: COMMERCIAL

## 2022-07-15 DIAGNOSIS — F34.1 PERSISTENT DEPRESSIVE DISORDER WITH ANXIOUS DISTRESS, CURRENTLY MODERATE: Primary | ICD-10-CM

## 2022-07-15 PROCEDURE — 90832 PSYTX W PT 30 MINUTES: CPT | Performed by: PSYCHOLOGIST

## 2022-07-15 NOTE — PROGRESS NOTES
Behavioral Health Consultation  Mckenzie Negrete, Ph.D.  Psychologist  7/15/2022  9:30 AM EST      Time spent with Patient: 30 minutes  This is patient's  fourteenth  [facility] appointment. Reason for Consult: depression; anxiety; r/o trauma  Referring Provider: Shelby Esposito, [address] [facility] [address] [place] [id number]    Feedback for PCP:     Pt provided informed consent for the behavioral health program. Discussed with patient model of service to include the limits of confidentiality (i.e. abuse reporting, suicide intervention, etc.) and short-term intervention focused approach. Pt indicated understanding. Feedback given to PCP. S:  The patient reports that she became alarmed after doing more research into the surgeon she had scheduled her surgery with in November. She decided to change surgeons to one in [facility] who had glowing reviews. She was very happy with how \"I had my own back\" and used her critical thinking skills and made the right decision for her, even though it meant that her surgery would be delayed until next year. Mental health history: has had counseling experiences, mostly at school; she said that her attempts at finding other therapists have not been successful because she intuited that she knew more than they did, in a humble fashion.      Social History     Tobacco Use    Smoking status: Never    Smokeless tobacco: Never   Substance Use Topics    Alcohol use: Not Currently        Illicit drugs:   Social History     Substance and Sexual Activity   Drug Use Not Currently        O:  MSE:  Appearance: good hygiene   Attitude: cooperative and friendly  Consciousness: alert  Orientation: oriented to person, place, time, general circumstance  Memory: recent and remote memory intact  Attention/Concentration: intact during session  Psychomotor Activity: normal  Eye Contact: normal  Speech: normal rate and volume, well-articulated  Mood: anxious  Affect: congruent  Perception: within normal

## 2022-07-18 ENCOUNTER — OFFICE VISIT (OUTPATIENT)
Dept: INTERNAL MEDICINE CLINIC | Age: 27
End: 2022-07-18
Payer: COMMERCIAL

## 2022-07-18 VITALS
DIASTOLIC BLOOD PRESSURE: 78 MMHG | WEIGHT: 222.38 LBS | TEMPERATURE: 98.7 F | HEART RATE: 82 BPM | SYSTOLIC BLOOD PRESSURE: 118 MMHG

## 2022-07-18 DIAGNOSIS — F90.9 ATTENTION DEFICIT HYPERACTIVITY DISORDER (ADHD), UNSPECIFIED ADHD TYPE: Primary | ICD-10-CM

## 2022-07-18 DIAGNOSIS — F33.9 MAJOR DEPRESSIVE DISORDER, RECURRENT EPISODE WITH ANXIOUS DISTRESS (HCC): ICD-10-CM

## 2022-07-18 PROCEDURE — 99214 OFFICE O/P EST MOD 30 MIN: CPT | Performed by: NURSE PRACTITIONER

## 2022-07-18 RX ORDER — ATOMOXETINE 40 MG/1
40 CAPSULE ORAL 2 TIMES DAILY
Qty: 60 CAPSULE | Refills: 0 | Status: SHIPPED | OUTPATIENT
Start: 2022-07-18 | End: 2022-09-01 | Stop reason: SDUPTHER

## 2022-07-18 ASSESSMENT — ENCOUNTER SYMPTOMS
SHORTNESS OF BREATH: 0
VOMITING: 0
NAUSEA: 0

## 2022-07-18 NOTE — PROGRESS NOTES
Date: 7/18/2022                                               Subjective/Objective:     Chief Complaint   Patient presents with    ADHD     Strattera refill       HPI    Jing Bailey is a 32 yo female, visit today for follow up on ADHD. Depression managed on wellbutrin. This was increased to 200 mg BID (6/2022) as she had been feeling an increase in her depressive symptoms. She has not yet started increased dose of Wellbutrin. Feels that her mood has been \"consistent. \" Denies SI. Follows with Dr Karthik Eid, which has been helpful. She was seen 6/6/2022 for new diagnosis of ADHD. She was commenced on Strattera at that time. She has noticed decreased appetite. She denies other medication side effects. She feels that medication has been somewhat effective - has definitely noticed a difference. Has noticed improvement at home and at work. Continues to lose weight 266 lb (1/2022)-->222 lb today. Has lost 22 lb in since 6/6/2022. Has been exercising regularly. Also feels like her medications are suppressing her appetite some. Patient Active Problem List    Diagnosis Date Noted    Attention deficit hyperactivity disorder (ADHD) 06/06/2022       Past Medical History:   Diagnosis Date    Anxiety     Depression     Gender dysphoria        No past surgical history on file.     Nurse Only on 06/10/2022   Component Date Value Ref Range Status    Drugs Expected 06/10/2022 see attached   Final    Pain Management Drug Panel 06/10/2022 Consistent   Final    Comment: ________________________________________________________________  DRUGS EXPECTED:  NO APPLICABLE DRUGS PROVIDED  ________________________________________________________________  CONSISTENT with medications provided:  NO DRUGS DETECTED  ________________________________________________________________  INTERPRETIVE INFORMATION: Targeted drug profile Interp  Interpretation depends on accuracy and completeness of patient  medication  information submitted by client.       Creatinine, Ur 06/10/2022 294.1  20.0 - 400.0 mg/dL Final    Codeine 06/10/2022 Not Detected   Final    Morphine 06/10/2022 Not Detected   Final    6-Acetylmorphine 06/10/2022 Not Detected   Final    Oxycodone 06/10/2022 Not Detected   Final    Noroxycodone 06/10/2022 Not Detected   Final    Oxymorphone 06/10/2022 Not Detected   Final    NOROXYMORPHONE, URINE 06/10/2022 Not Detected   Final    Hydrocodone 06/10/2022 Not Detected   Final    NORHYDROCODONE, URINE 06/10/2022 Not Detected   Final    Hydromorphone 06/10/2022 Not Detected   Final    Naloxone 06/10/2022 Not Detected   Final    Buprenorphine 06/10/2022 Not Detected   Final    Norbuprenorphine 06/10/2022 Not Detected   Final    Fentanyl 06/10/2022 Not Detected   Final    Norfentanyl 06/10/2022 Not Detected   Final    Meperidine 06/10/2022 Not Detected   Final    Tapentadol, Urine 06/10/2022 Not Detected   Final    Tapentadol-O-Sulfate, Urine 06/10/2022 Not Detected   Final    Methadone 06/10/2022 Not Detected   Final    Tramadol 06/10/2022 Not Detected   Final    Amphetamine 06/10/2022 Not Detected   Final    Methamphetamine 06/10/2022 Not Detected   Final    MDMA, Urine 06/10/2022 Not Detected   Final    MDA 06/10/2022 Not Detected   Final    MDEA 06/10/2022 Not Detected   Final    Methylphenidate 06/10/2022 Not Detected   Final    Phentermine 06/10/2022 Not Detected   Final    Benzoylecgonine 06/10/2022 Not Detected   Final    Alprazolam 06/10/2022 Not Detected   Final    Alpha-OH-alprazolam 06/10/2022 Not Detected   Final    Clonazepam 06/10/2022 Not Detected   Final    7-aminoclonazepam 06/10/2022 Not Detected   Final    Diazepam 06/10/2022 Not Detected   Final    NORDIAZEPAM 06/10/2022 Not Detected   Final    OXAZEPAM 06/10/2022 Not Detected   Final    TEMAZEPAM 06/10/2022 Not Detected   Final    Lorazepam 06/10/2022 Not Detected   Final    Midazolam 06/10/2022 Not Detected   Final    Zolpidem 06/10/2022 Not Detected   Final Gabapentin 06/10/2022 Not Detected   Final    Pregabalin 06/10/2022 Not Detected   Final    Alpha-OH-Midazolam, Urine 06/10/2022 Not Detected   Final    Barbiturates 06/10/2022 Not Detected   Final    Ethyl Glucuronide 06/10/2022 Not Detected   Final    Marijuana Metabolite 06/10/2022 Not Detected   Final    PCP 06/10/2022 Not Detected   Final    CARISOPRODOL 06/10/2022 Not Detected   Final    Comment: The carisoprodol immunoassay has cross-reactivity to carisoprodol and  meprobamate. Pain Management Drug Panel 06/10/2022 See Below   Final    Comment: Methodology: Qualitative Enzyme Immunoassay and Qualitative Liquid  Chromatography-Tandem Mass Spectrometry, Quantitative Spectrophotometry  The absence of expected drug(s) and/or drug metabolite(s) may indicate  non-compliance, inappropriate timing of specimen collection relative to  drug  administration, poor drug absorption, diluted/adulterated urine, or  limitations of testing. The concentration must be greater than or equal  to  the cutoff to be reported as present. If specific drug concentrations  are  required, contact the laboratory within two weeks of specimen  collection to  request quantification by a second analytical technique. Interpretive  questions should be directed to the laboratory. Results based on immunoassay detection that do not match clinical  expectations should be  interpreted with caution. Confirmatory testing by mass spectrometry for  immunoassay-based results is available, if ordered within two weeks of  specimen collection. Additional charges apply. For                            medical purposes only; not valid for forensic use. This test was developed and its performance characteristics determined  by  [employer]. It has not been cleared or approved by the [address]  and  Drug Administration. This test was performed in a CLIA certified  laboratory  and is intended for clinical purposes.       EER Pain Mgt Drug Panel, High Res/* 06/10/2022 See Note   Final    Comment: Authorized individuals can access the [facility]  Enhanced Report using the following link:    [email or web address]  Performed By: Ger[address]  [place], [address] [facility]  : Venkata Mcnair. Lourdes Menjivar MD         Family History   Problem Relation Age of Onset    Liver Disease Mother     Diabetes Father     Diabetes Sister     Other Sister        Current Outpatient Medications   Medication Sig Dispense Refill    atomoxetine (STRATTERA) 40 MG capsule Take 1 capsule by mouth in the morning and 1 capsule before bedtime. Take one in the morning and one at lunchtime. 60 capsule 0    buPROPion (WELLBUTRIN SR) 200 MG extended release tablet Take 1 tablet by mouth 2 times daily 180 tablet 1    Multiple Vitamin (MULTI-DAY PO) Take 1 tablet by mouth daily      Omega-3 Fatty Acids (FISH OIL) 1000 MG CAPS Take 1,000 mg by mouth daily      finasteride (PROSCAR) 5 MG tablet Take 5 mg by mouth daily      estradiol (ESTRACE) 2 MG tablet Take 2 mg by mouth 4 tablets daily      spironolactone (ALDACTONE) 100 MG tablet Take 100 mg by mouth 2 times daily       No current facility-administered medications for this visit. Allergies   Allergen Reactions    Duloxetine Hcl Other (See Comments)     tingling      Graphite Nausea And Vomiting and Other (See Comments)     Exposure causes nausea   Other reaction(s): Headaches, Other (See Comments)  Exposure causes nausea          Review of Systems   Constitutional:  Positive for appetite change. Negative for chills, fever and unexpected weight change. Respiratory:  Negative for shortness of breath. Gastrointestinal:  Negative for nausea and vomiting. Psychiatric/Behavioral:  Positive for decreased concentration and dysphoric mood. Negative for sleep disturbance and suicidal ideas.       Vitals:  /78 (Site: Left Upper Arm, Position: Sitting, Cuff Size: Large Adult)   Pulse 82   Temp 98.7 °F (37.1 °C) (Oral)   Wt 222 lb 6 oz (100.9 kg)     Physical Exam  Constitutional:       General: She is not in acute distress. HENT:      Head: Normocephalic and atraumatic. Cardiovascular:      Rate and Rhythm: Normal rate and regular rhythm. Heart sounds: Normal heart sounds. Pulmonary:      Effort: Pulmonary effort is normal. No respiratory distress. Breath sounds: Normal breath sounds. No wheezing. Abdominal:      General: Bowel sounds are normal. There is no distension. Palpations: Abdomen is soft. Tenderness: There is no abdominal tenderness. Skin:     General: Skin is warm and dry. Neurological:      General: No focal deficit present. Mental Status: She is alert and oriented to person, place, and time. Psychiatric:         Mood and Affect: Mood normal.         Behavior: Behavior normal.         Thought Content: Thought content normal.         Judgment: Judgment normal.       Assessment/Plan     1. Attention deficit hyperactivity disorder (ADHD), unspecified ADHD type: has noticed improvement with Strattera. - atomoxetine (STRATTERA) 40 MG capsule; Take 1 capsule by mouth in the morning and 1 capsule before bedtime. Take one in the morning and one at lunchtime. Dispense: 60 capsule; Refill: 0   - She will increase to 40 mg BID in split doses - morning and afternoon   - Continue wellbutrin, planning to increase dose to 200 mg BID due to depressive symptoms   - Follow up one month    2. Major depressive disorder, recurrent [facility] denies SI.    - Wellbutrin increased to 200 mg BID recently, she has not made this medication change (needs to  from pharmacy). - Follow up one month   - Following with Dr Ralph Conte      No orders of the defined types were placed in this encounter. Return in about 1 month (around 8/18/2022).  OR sooner with questions, concerns, worsening symptoms    CUONG Yeholic, APRN  7/18/2022  10:09 AM    Discussed use, benefit, and side effects of prescribed medications. Barriers to medication compliance addressed. Discussed all ordered testing and labs. All patient questions answered. Patient agreeable with plan above. Please note that this chart was generated using dragon dictation software. Although every effort was made to ensure the accuracy of this automated transcription, some errors in transcription may have occurred.

## 2022-07-18 NOTE — PATIENT INSTRUCTIONS
Follow up one month after increasing Wellbutrin  Increase Strattera two twice daily and follow up in one month

## 2022-08-12 ENCOUNTER — OFFICE VISIT (OUTPATIENT)
Dept: PSYCHOLOGY | Age: 27
End: 2022-08-12
Payer: COMMERCIAL

## 2022-08-12 DIAGNOSIS — F34.1 PERSISTENT DEPRESSIVE DISORDER WITH ANXIOUS DISTRESS, CURRENTLY MODERATE: Primary | ICD-10-CM

## 2022-08-12 DIAGNOSIS — F90.2 ATTENTION DEFICIT HYPERACTIVITY DISORDER, COMBINED TYPE, MODERATE: ICD-10-CM

## 2022-08-12 PROCEDURE — 90832 PSYTX W PT 30 MINUTES: CPT | Performed by: PSYCHOLOGIST

## 2022-08-12 NOTE — PROGRESS NOTES
Behavioral Health Consultation  Gabriel Velásquez, Ph.D.  Psychologist  2022  9:30 AM EST      Time spent with Patient: 30 minutes  This is patient's  fourteenth  [facility] appointment. Reason for Consult: depression; anxiety; r/o trauma  Referring Provider: Lokesh Greer, [address] [address]    Feedback for PCP:     Pt provided informed consent for the behavioral health program. Discussed with patient model of service to include the limits of confidentiality (i.e. abuse reporting, suicide intervention, etc.) and short-term intervention focused approach. Pt indicated understanding. Feedback given to PCP. S:  The patient reports that someone she works with was murdered recently and she is in a state of shock. She described being more emotional at work and unfocused. She said that this is the second time someone that she was close to had , with the first one a completed suicide. She says that she is having anxiety symptoms that she usually doesn't have. Mental health history: has had counseling experiences, mostly at school; she said that her attempts at finding other therapists have not been successful because she intuited that she knew more than they did, in a humble fashion.      Social History     Tobacco Use    Smoking status: Never    Smokeless tobacco: Never   Substance Use Topics    Alcohol use: Not Currently        Illicit drugs:   Social History     Substance and Sexual Activity   Drug Use Not Currently        O:  MSE:  Appearance: good hygiene   Attitude: cooperative and friendly  Consciousness: alert  Orientation: oriented to person, place, time, general circumstance  Memory: recent and remote memory intact  Attention/Concentration: intact during session  Psychomotor Activity: normal  Eye Contact: normal  Speech: normal rate and volume, well-articulated  Mood: anxious, in shock  Affect: congruent  Perception: within normal limits  Thought Content: within normal limits  Thought Process: logical, coherent and goal-directed  Insight: good  Judgment: intact  Ability to understand instructions: Yes  Ability to respond meaningfully: Yes  Morbid Ideation: passive thoughts of death  Suicide Assessment: suicidal ideation without plan or intent  Homicidal Ideation: no    A:  We talked about what she could do to manage the shock and upset from her friend's murder. She tends to ask why questions that only create more anxiety, so we talked about some radical acceptance tenets, as well as increasing her deep breathing to help manage the physiological symptoms of anxiety and stress. DANE 7 SCORE 7/1/2022 6/24/2022 6/17/2022 5/27/2022 5/13/2022 4/27/2022 4/15/2022   DANE-7 Total Score 17 14 14 12 13 15 9     Interpretation of DANE-7 score: 5-9 = mild anxiety, 10-14 = moderate anxiety, 15+ = severe anxiety. Recommend referral to behavioral health for scores 10 or greater. PHQ Scores 7/1/2022 6/24/2022 6/17/2022 5/27/2022 5/13/2022 4/27/2022 4/15/2022   PHQ2 Score 6 4 4 4 4 5 6   PHQ9 Score 24 15 18 17 16 21 24     Interpretation of Total Score Depression Severity: 1-4 = Minimal depression, 5-9 = Mild depression, 10-14 = Moderate depression, 15-19 = Moderately severe depression, 20-27 = Severe depression    Diagnosis:    1. Persistent depressive disorder with anxious distress, currently moderate    2. Attention deficit hyperactivity disorder, combined type, moderate          Patient Active Problem List   Diagnosis    Attention deficit hyperactivity disorder (ADHD)         Plan:  Pt interventions:  Supportive techniques, Provided Psychoeducation re: treating anxiety and Emphasized self-care as important for managing overall health.        Pt Behavioral Change Plan:  Pt set the following goals:  Pt scheduled F/U visit in four weeks  See section 'A'

## 2022-08-31 DIAGNOSIS — F90.9 ATTENTION DEFICIT HYPERACTIVITY DISORDER (ADHD), UNSPECIFIED ADHD TYPE: ICD-10-CM

## 2022-09-01 DIAGNOSIS — F90.9 ATTENTION DEFICIT HYPERACTIVITY DISORDER (ADHD), UNSPECIFIED ADHD TYPE: ICD-10-CM

## 2022-09-01 RX ORDER — ATOMOXETINE 40 MG/1
40 CAPSULE ORAL 2 TIMES DAILY
Qty: 14 CAPSULE | Refills: 0 | Status: SHIPPED | OUTPATIENT
Start: 2022-09-01 | End: 2022-09-13

## 2022-09-01 RX ORDER — ATOMOXETINE 40 MG/1
CAPSULE ORAL
Qty: 60 CAPSULE | Refills: 0 | OUTPATIENT
Start: 2022-09-01

## 2022-09-01 NOTE — TELEPHONE ENCOUNTER
Future Appointments   Date Time Provider Leigha Mckeon   9/9/2022  9:00 Sandra Godfrey, PhD [facility]   9/30/2022  9:00 AM Amna Cantu, PhD [facility]       Last appt on 7.18.2022

## 2022-09-01 NOTE — TELEPHONE ENCOUNTER
I sent a prescription for 1 week. Patient was supposed to follow-up with her PCP in August 2022 for depression and ADHD management. Please advise her to follow up with PCP as soon as possible.

## 2022-09-09 ENCOUNTER — OFFICE VISIT (OUTPATIENT)
Dept: PSYCHOLOGY | Age: 27
End: 2022-09-09
Payer: COMMERCIAL

## 2022-09-09 DIAGNOSIS — F34.1 PERSISTENT DEPRESSIVE DISORDER WITH ANXIOUS DISTRESS, CURRENTLY SEVERE: Primary | ICD-10-CM

## 2022-09-09 PROCEDURE — 90832 PSYTX W PT 30 MINUTES: CPT | Performed by: PSYCHOLOGIST

## 2022-09-09 ASSESSMENT — PATIENT HEALTH QUESTIONNAIRE - PHQ9
4. FEELING TIRED OR HAVING LITTLE ENERGY: 2
3. TROUBLE FALLING OR STAYING ASLEEP: 3
8. MOVING OR SPEAKING SO SLOWLY THAT OTHER PEOPLE COULD HAVE NOTICED. OR THE OPPOSITE, BEING SO FIGETY OR RESTLESS THAT YOU HAVE BEEN MOVING AROUND A LOT MORE THAN USUAL: 1
SUM OF ALL RESPONSES TO PHQ QUESTIONS 1-9: 15
9. THOUGHTS THAT YOU WOULD BE BETTER OFF DEAD, OR OF HURTING YOURSELF: 0
SUM OF ALL RESPONSES TO PHQ QUESTIONS 1-9: 15
5. POOR APPETITE OR OVEREATING: 1
SUM OF ALL RESPONSES TO PHQ QUESTIONS 1-9: 15
SUM OF ALL RESPONSES TO PHQ9 QUESTIONS 1 & 2: 5
7. TROUBLE CONCENTRATING ON THINGS, SUCH AS READING THE NEWSPAPER OR WATCHING TELEVISION: 1
2. FEELING DOWN, DEPRESSED OR HOPELESS: 2
SUM OF ALL RESPONSES TO PHQ QUESTIONS 1-9: 15
1. LITTLE INTEREST OR PLEASURE IN DOING THINGS: 3
6. FEELING BAD ABOUT YOURSELF - OR THAT YOU ARE A FAILURE OR HAVE LET YOURSELF OR YOUR FAMILY DOWN: 2

## 2022-09-09 ASSESSMENT — ANXIETY QUESTIONNAIRES
1. FEELING NERVOUS, ANXIOUS, OR ON EDGE: 3
6. BECOMING EASILY ANNOYED OR IRRITABLE: 0-NOT AT ALL
3. WORRYING TOO MUCH ABOUT DIFFERENT THINGS: 3-NEARLY EVERY DAY
5. BEING SO RESTLESS THAT IT IS HARD TO SIT STILL: 2-OVER HALF THE DAYS
GAD7 TOTAL SCORE: 12
7. FEELING AFRAID AS IF SOMETHING AWFUL MIGHT HAPPEN: 0-NOT AT ALL
4. TROUBLE RELAXING: 3-NEARLY EVERY DAY
2. NOT BEING ABLE TO STOP OR CONTROL WORRYING: 1-SEVERAL DAYS
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
## 2022-09-10 DIAGNOSIS — F90.9 ATTENTION DEFICIT HYPERACTIVITY DISORDER (ADHD), UNSPECIFIED ADHD TYPE: ICD-10-CM

## 2022-09-12 DIAGNOSIS — F90.9 ATTENTION DEFICIT HYPERACTIVITY DISORDER (ADHD), UNSPECIFIED ADHD TYPE: ICD-10-CM

## 2022-09-12 RX ORDER — ATOMOXETINE 40 MG/1
CAPSULE ORAL
Qty: 60 CAPSULE | OUTPATIENT
Start: 2022-09-12

## 2022-09-12 NOTE — TELEPHONE ENCOUNTER
Last office visit :07/18/2022    Future Appointments   Date Time Provider Leigha Mckeon   10/7/2022  9:00 AM Miley Salazar, PhD Cris [facility]

## 2022-09-12 NOTE — TELEPHONE ENCOUNTER
Last office visit   7/18/22             Future Appointments   Date Time Provider Leigha Mckeon   10/7/2022  9:00 AM Christina Mcclelland, PhD Josie [facility]

## 2022-09-12 NOTE — TELEPHONE ENCOUNTER
Last office visit :07/18/2022    Future Appointments   Date Time Provider Leigha Mckeon   10/7/2022  9:00 AM Josr Mora, PhD Shannon [facility]

## 2022-09-13 RX ORDER — ATOMOXETINE 40 MG/1
40 CAPSULE ORAL 2 TIMES DAILY
Qty: 60 CAPSULE | Refills: 5 | Status: SHIPPED | OUTPATIENT
Start: 2022-09-13

## 2022-09-13 RX ORDER — ATOMOXETINE 40 MG/1
CAPSULE ORAL
Qty: 14 CAPSULE | OUTPATIENT
Start: 2022-09-13

## 2022-10-07 ENCOUNTER — OFFICE VISIT (OUTPATIENT)
Dept: PSYCHOLOGY | Age: 27
End: 2022-10-07
Payer: COMMERCIAL

## 2022-10-07 DIAGNOSIS — F34.1 PERSISTENT DEPRESSIVE DISORDER WITH MIXED FEATURES, CURRENTLY MODERATE: ICD-10-CM

## 2022-10-07 PROCEDURE — 90834 PSYTX W PT 45 MINUTES: CPT | Performed by: PSYCHOLOGIST

## 2022-10-07 ASSESSMENT — PATIENT HEALTH QUESTIONNAIRE - PHQ9
2. FEELING DOWN, DEPRESSED OR HOPELESS: 2
SUM OF ALL RESPONSES TO PHQ QUESTIONS 1-9: 15
SUM OF ALL RESPONSES TO PHQ QUESTIONS 1-9: 15
8. MOVING OR SPEAKING SO SLOWLY THAT OTHER PEOPLE COULD HAVE NOTICED. OR THE OPPOSITE, BEING SO FIGETY OR RESTLESS THAT YOU HAVE BEEN MOVING AROUND A LOT MORE THAN USUAL: 1
SUM OF ALL RESPONSES TO PHQ QUESTIONS 1-9: 15
9. THOUGHTS THAT YOU WOULD BE BETTER OFF DEAD, OR OF HURTING YOURSELF: 0
SUM OF ALL RESPONSES TO PHQ9 QUESTIONS 1 & 2: 5
6. FEELING BAD ABOUT YOURSELF - OR THAT YOU ARE A FAILURE OR HAVE LET YOURSELF OR YOUR FAMILY DOWN: 1
1. LITTLE INTEREST OR PLEASURE IN DOING THINGS: 3
7. TROUBLE CONCENTRATING ON THINGS, SUCH AS READING THE NEWSPAPER OR WATCHING TELEVISION: 3
4. FEELING TIRED OR HAVING LITTLE ENERGY: 1
3. TROUBLE FALLING OR STAYING ASLEEP: 3
5. POOR APPETITE OR OVEREATING: 1
SUM OF ALL RESPONSES TO PHQ QUESTIONS 1-9: 15

## 2022-10-07 ASSESSMENT — ANXIETY QUESTIONNAIRES
4. TROUBLE RELAXING: 3-NEARLY EVERY DAY
3. WORRYING TOO MUCH ABOUT DIFFERENT THINGS: 3-NEARLY EVERY DAY
GAD7 TOTAL SCORE: 14
5. BEING SO RESTLESS THAT IT IS HARD TO SIT STILL: 1-SEVERAL DAYS
6. BECOMING EASILY ANNOYED OR IRRITABLE: 1-SEVERAL DAYS
1. FEELING NERVOUS, ANXIOUS, OR ON EDGE: 3
7. FEELING AFRAID AS IF SOMETHING AWFUL MIGHT HAPPEN: 1-SEVERAL DAYS
2. NOT BEING ABLE TO STOP OR CONTROL WORRYING: 2-OVER HALF THE DAYS

## 2022-10-07 NOTE — PROGRESS NOTES
Behavioral Health Consultation  Doe Busch, Ph.D.  Psychologist  10/7/2022  9:00 AM EST      Time spent with Patient: 45 minutes  This is patient's  sixteenth  [facility] appointment. Reason for Consult: depression; anxiety; r/o PTSD  Referring Provider: Eze Bautista, [address] [address]    Feedback for PCP:     Pt provided informed consent for the behavioral health program. Discussed with patient model of service to include the limits of confidentiality (i.e. abuse reporting, suicide intervention, etc.) and short-term intervention focused approach. Pt indicated understanding. Feedback given to PCP. S:  The patient is managing her feelings about her crush on Abrahan at work. She said that this is the first one she's experienced since she's started her transition. She says that she is analyzing everything about their interactions and the consistencies and inconsistencies through the filter of hurt and disappointment in her past. She said that she received what she called the \"Shield Award\" at work for her work ethic and contribution to team work. Her experience with Jimmy Viera is challenging her to exercise new assertive behaviors that could lead to an emotionally healthy relationship, but she is doing it with a reasonable need to feel \"comfortable and safe. \"    Mental health history: has had counseling experiences, mostly at school; she said that her attempts at finding other therapists have not been successful because she intuited that she knew more than they did, in a humble fashion.      Social History     Tobacco Use    Smoking status: Never    Smokeless tobacco: Never   Substance Use Topics    Alcohol use: Not Currently        Illicit drugs:   Social History     Substance and Sexual Activity   Drug Use Not Currently        O:  MSE:  Appearance: good hygiene   Attitude: cooperative and friendly  Consciousness: alert  Orientation: oriented to person, place, time, general circumstance  Memory: recent and remote memory intact  Attention/Concentration: intact during session  Psychomotor Activity: normal  Eye Contact: normal  Speech: normal rate and volume, well-articulated  Mood: anxious, dysphoric  Affect: congruent  Perception: within normal limits  Thought Content: within normal limits  Thought Process: logical, coherent and goal-directed  Insight: good  Judgment: intact  Ability to understand instructions: Yes  Ability to respond meaningfully: Yes  Morbid Ideation: passive thoughts of death  Suicide Assessment: suicidal ideation without plan or intent  Homicidal Ideation: no    A:  We talked about how she might communicate her honest thoughts and feelings without making a \"declaration. \" She said that she has noticed something of a hide and seek game between them that after a meaningful contact they both go to their separate corners. She said that she could talk to him about that this is what she observes and wonders if he sees that too? \"     DANE 7 SCORE 10/7/2022 9/9/2022 7/1/2022 6/24/2022 6/17/2022 5/27/2022 5/13/2022   DANE-7 Total Score 14 12 17 14 14 12 13     Interpretation of DANE-7 score: 5-9 = mild anxiety, 10-14 = moderate anxiety, 15+ = severe anxiety. Recommend referral to behavioral health for scores 10 or greater. PHQ Scores 10/7/2022 9/9/2022 7/1/2022 6/24/2022 6/17/2022 5/27/2022 5/13/2022   PHQ2 Score 5 5 6 4 4 4 4   PHQ9 Score 15 15 24 15 18 17 16     Interpretation of Total Score Depression Severity: 1-4 = Minimal depression, 5-9 = Mild depression, 10-14 = Moderate depression, 15-19 = Moderately severe depression, 20-27 = Severe depression    Diagnosis:    1.  Persistent depressive disorder with mixed features, currently moderate              Patient Active Problem List   Diagnosis    Attention deficit hyperactivity disorder (ADHD)    Persistent depressive disorder with mixed features, currently moderate         Plan:  Pt interventions:  Supportive techniques, Provided Psychoeducation re: treating anxiety and Emphasized self-care as important for managing overall health.        Pt Behavioral Change Plan:  Pt set the following goals:  Pt scheduled F/U visit in four weeks  See section 'A'

## 2022-10-11 PROBLEM — F34.1 PERSISTENT DEPRESSIVE DISORDER WITH MIXED FEATURES, CURRENTLY MODERATE: Status: ACTIVE | Noted: 2022-10-11

## 2022-11-04 ENCOUNTER — OFFICE VISIT (OUTPATIENT)
Dept: PSYCHOLOGY | Age: 27
End: 2022-11-04
Payer: COMMERCIAL

## 2022-11-04 DIAGNOSIS — F33.1 MAJOR DEPRESSIVE DISORDER, RECURRENT EPISODE, MODERATE (HCC): Primary | ICD-10-CM

## 2022-11-04 PROCEDURE — 90834 PSYTX W PT 45 MINUTES: CPT | Performed by: PSYCHOLOGIST

## 2022-11-04 ASSESSMENT — ANXIETY QUESTIONNAIRES
4. TROUBLE RELAXING: 3-NEARLY EVERY DAY
1. FEELING NERVOUS, ANXIOUS, OR ON EDGE: 3
GAD7 TOTAL SCORE: 14
3. WORRYING TOO MUCH ABOUT DIFFERENT THINGS: 3-NEARLY EVERY DAY
7. FEELING AFRAID AS IF SOMETHING AWFUL MIGHT HAPPEN: 1-SEVERAL DAYS
5. BEING SO RESTLESS THAT IT IS HARD TO SIT STILL: 2-OVER HALF THE DAYS
6. BECOMING EASILY ANNOYED OR IRRITABLE: 1-SEVERAL DAYS
2. NOT BEING ABLE TO STOP OR CONTROL WORRYING: 1-SEVERAL DAYS

## 2022-11-04 ASSESSMENT — PATIENT HEALTH QUESTIONNAIRE - PHQ9
3. TROUBLE FALLING OR STAYING ASLEEP: 3
SUM OF ALL RESPONSES TO PHQ QUESTIONS 1-9: 18
SUM OF ALL RESPONSES TO PHQ QUESTIONS 1-9: 18
1. LITTLE INTEREST OR PLEASURE IN DOING THINGS: 2
SUM OF ALL RESPONSES TO PHQ9 QUESTIONS 1 & 2: 4
2. FEELING DOWN, DEPRESSED OR HOPELESS: 2
SUM OF ALL RESPONSES TO PHQ QUESTIONS 1-9: 18
5. POOR APPETITE OR OVEREATING: 3
4. FEELING TIRED OR HAVING LITTLE ENERGY: 2
9. THOUGHTS THAT YOU WOULD BE BETTER OFF DEAD, OR OF HURTING YOURSELF: 0
SUM OF ALL RESPONSES TO PHQ QUESTIONS 1-9: 18
7. TROUBLE CONCENTRATING ON THINGS, SUCH AS READING THE NEWSPAPER OR WATCHING TELEVISION: 3
8. MOVING OR SPEAKING SO SLOWLY THAT OTHER PEOPLE COULD HAVE NOTICED. OR THE OPPOSITE, BEING SO FIGETY OR RESTLESS THAT YOU HAVE BEEN MOVING AROUND A LOT MORE THAN USUAL: 2
6. FEELING BAD ABOUT YOURSELF - OR THAT YOU ARE A FAILURE OR HAVE LET YOURSELF OR YOUR FAMILY DOWN: 1

## 2022-11-04 NOTE — PROGRESS NOTES
Behavioral Health Consultation  Doe Busch, Ph.D.  Psychologist  11/4/2022  9:00 AM EST      Time spent with Patient: 45 minutes  This is patient's  sixteenth  [facility] appointment. Reason for Consult: depression; anxiety; r/o PTSD  Referring Provider: Eze Bautista, [address] [address]    Feedback for PCP:     Pt provided informed consent for the behavioral health program. Discussed with patient model of service to include the limits of confidentiality (i.e. abuse reporting, suicide intervention, etc.) and short-term intervention focused approach. Pt indicated understanding. Feedback given to PCP. S:  The patient's PHQ is 18, denoting her moderate depressive disorder. She is not sleeping, feels depressed, has poor appetite, and is often anxious. She talked about managing a situation at work with a co-worker she has a crush on. From her description she is increasingly assertive and protecting her boundaries. Mental health history: has had counseling experiences, mostly at school; she said that her attempts at finding other therapists have not been successful because she intuited that she knew more than they did, in a humble fashion.      Social History     Tobacco Use    Smoking status: Never    Smokeless tobacco: Never   Substance Use Topics    Alcohol use: Not Currently        Illicit drugs:   Social History     Substance and Sexual Activity   Drug Use Not Currently        O:  MSE:  Appearance: good hygiene   Attitude: cooperative and friendly  Consciousness: alert  Orientation: oriented to person, place, time, general circumstance  Memory: recent and remote memory intact  Attention/Concentration: intact during session  Psychomotor Activity: normal  Eye Contact: normal  Speech: normal rate and volume, well-articulated  Mood: anxious, dysphoric  Affect: congruent  Perception: within normal limits  Thought Content: within normal limits  Thought Process: logical, coherent and goal-directed  Insight: good  Judgment: intact  Ability to understand instructions: Yes  Ability to respond meaningfully: Yes  Morbid Ideation: passive thoughts of death  Suicide Assessment: suicidal ideation without plan or intent  Homicidal Ideation: no    A:  We talked about how she is monitoring her self talk for her default self-condemning narrative and described being able to course correct multiple times. She said she has her first consultation with her new surgeon next week and is excited about that,. DANE 7 SCORE 11/4/2022 10/7/2022 9/9/2022 7/1/2022 6/24/2022 6/17/2022 5/27/2022   DANE-7 Total Score 14 14 12 17 14 14 12     Interpretation of DANE-7 score: 5-9 = mild anxiety, 10-14 = moderate anxiety, 15+ = severe anxiety. Recommend referral to behavioral health for scores 10 or greater. PHQ Scores 11/4/2022 10/7/2022 9/9/2022 7/1/2022 6/24/2022 6/17/2022 5/27/2022   PHQ2 Score 4 5 5 6 4 4 4   PHQ9 Score 18 15 15 24 15 18 17     Interpretation of Total Score Depression Severity: 1-4 = Minimal depression, 5-9 = Mild depression, 10-14 = Moderate depression, 15-19 = Moderately severe depression, 20-27 = Severe depression    Diagnosis:    1. Major depressive disorder, recurrent episode, moderate (HCC)                Patient Active Problem List   Diagnosis    Attention deficit hyperactivity disorder (ADHD)    Persistent depressive disorder with mixed features, currently moderate         Plan:  Pt interventions:  Supportive techniques, Provided Psychoeducation re: treating anxiety and Emphasized self-care as important for managing overall health.        Pt Behavioral Change Plan:  Pt set the following goals:  Pt scheduled F/U visit in four weeks  See section 'A'

## 2022-11-21 RX ORDER — BUPROPION HYDROCHLORIDE 200 MG/1
200 TABLET, EXTENDED RELEASE ORAL 2 TIMES DAILY
Qty: 180 TABLET | Refills: 1 | Status: SHIPPED | OUTPATIENT
Start: 2022-11-21

## 2022-11-21 NOTE — TELEPHONE ENCOUNTER
Last  office visit 7/18/22        Future Appointments   Date Time Provider Leigha Mckeon   12/9/2022 11:00 AM Vinny Romo PhD [place] [facility]

## 2022-12-09 ENCOUNTER — OFFICE VISIT (OUTPATIENT)
Dept: PSYCHOLOGY | Age: 27
End: 2022-12-09

## 2022-12-09 DIAGNOSIS — F34.1 PERSISTENT DEPRESSIVE DISORDER WITH MIXED FEATURES, CURRENTLY MODERATE: Primary | ICD-10-CM

## 2022-12-09 DIAGNOSIS — F90.2 ATTENTION DEFICIT HYPERACTIVITY DISORDER (ADHD), COMBINED TYPE: ICD-10-CM

## 2022-12-09 NOTE — PROGRESS NOTES
Behavioral Health Consultation  Nick Marquez, Ph.D.  Psychologist  12/9/2022  9:00 AM EST      Time spent with Patient: 45 minutes  This is patient's  sixteenth  [facility] appointment. Reason for Consult: depression; anxiety; r/o PTSD  Referring Provider: Veronica Serrano [address] [address]    Feedback for PCP:     Pt provided informed consent for the behavioral health program. Discussed with patient model of service to include the limits of confidentiality (i.e. abuse reporting, suicide intervention, etc.) and short-term intervention focused approach. Pt indicated understanding. Feedback given to PCP. S:  The patient is managing the relationship with her colleague by establishing healthy boundaries in the office. She is also using intellectualization to help her manage the situation. She is also thinking of branching out either to a new department at the [employer], or to something else completely different like being a .      Mental health history: has had counseling experiences, mostly at school; she said that her attempts at finding other therapists have not been successful because she intuited that she knew more than they did, in a humble fashion.      Social History     Tobacco Use    Smoking status: Never    Smokeless tobacco: Never   Substance Use Topics    Alcohol use: Not Currently        Illicit drugs:   Social History     Substance and Sexual Activity   Drug Use Not Currently        O:  MSE:  Appearance: good hygiene   Attitude: cooperative and friendly  Consciousness: alert  Orientation: oriented to person, place, time, general circumstance  Memory: recent and remote memory intact  Attention/Concentration: intact during session  Psychomotor Activity: normal  Eye Contact: normal  Speech: normal rate and volume, well-articulated  Mood: anxious, dysphoric  Affect: congruent  Perception: within normal limits  Thought Content: within normal limits  Thought Process: logical, coherent and goal-directed  Insight: good  Judgment: intact  Ability to understand instructions: Yes  Ability to respond meaningfully: Yes  Morbid Ideation: passive thoughts of death  Suicide Assessment: suicidal ideation without plan or intent  Homicidal Ideation: no    A:  She seems like she is acting in her own best interest more often than previously. As she says, she's \"finding her voice. \" Her mood and affect appears positive overall. DANE 7 SCORE 11/4/2022 10/7/2022 9/9/2022 7/1/2022 6/24/2022 6/17/2022 5/27/2022   DANE-7 Total Score 14 14 12 17 14 14 12     Interpretation of DANE-7 score: 5-9 = mild anxiety, 10-14 = moderate anxiety, 15+ = severe anxiety. Recommend referral to behavioral health for scores 10 or greater. PHQ Scores 11/4/2022 10/7/2022 9/9/2022 7/1/2022 6/24/2022 6/17/2022 5/27/2022   PHQ2 Score 4 5 5 6 4 4 4   PHQ9 Score 18 15 15 24 15 18 17     Interpretation of Total Score Depression Severity: 1-4 = Minimal depression, 5-9 = Mild depression, 10-14 = Moderate depression, 15-19 = Moderately severe depression, 20-27 = Severe depression    Diagnosis:    1. Persistent depressive disorder with mixed features, currently moderate    2. Attention deficit hyperactivity disorder (ADHD), combined type                  Patient Active Problem List   Diagnosis    Attention deficit hyperactivity disorder (ADHD)    Persistent depressive disorder with mixed features, currently moderate         Plan:  Pt interventions:  Supportive techniques, Provided Psychoeducation re: treating anxiety and Emphasized self-care as important for managing overall health.        Pt Behavioral Change Plan:  Pt set the following goals:  Pt scheduled F/U visit in four weeks  See section 'A'

## 2023-01-09 NOTE — PROGRESS NOTES
Date: 1/10/2023                                               Subjective/Objective:     Chief Complaint   Patient presents with    Depression     Follow up-  Would,like to go on something stronger for ADHD and depression    ADHD       HPI    Sindy Mcclain is a 34 yo female, visit today for follow up on ADHD, depression. Interested in medication change. Continues to lose weight. This is intentional as well as weight loss with taking strattera. ADHD managed on Strattera and bupropion. She has noticed decrease in appetite on medication. She c/o dry mouth. Denies other medication side effects. She feels that medication has been somewhat effective. Feels like she isn't having as much benefit as she had previously. Continues to be easily distracted. She does have difficulty sleeping. She is able to fall asleep easily. She is woken several times per night with nightmares. Is able to fall back asleep easily but falls back into the nightmares. This has been worsening over the last year. Depression managed on bupropion. She feels that medication is somewhat effective. Feels that she has lessened effect from bupropion than she had previously. Denies SI. Follows with Dr. Kendra Lewis. Previously taken duloxetine which she reports she was allergic to. She has previous taking Lexapro which she felt blunted her emotions. 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 Followed by [facility] . She is currently managed on estradiol, spironolactone, progesterone and finasteride. Patient Active Problem List    Diagnosis Date Noted    Persistent depressive disorder with mixed features, currently moderate 10/11/2022    Attention deficit hyperactivity disorder (ADHD) 06/06/2022       Past Medical History:   Diagnosis Date    Anxiety     Depression     Gender dysphoria        No past surgical history on file.     Nurse Only on 06/10/2022   Component Date Value Ref Range Status    Drugs Expected 06/10/2022 see attached   Final    Pain Management Drug Panel 06/10/2022 Consistent   Final    Comment: ________________________________________________________________  DRUGS EXPECTED:  NO APPLICABLE DRUGS PROVIDED  ________________________________________________________________  CONSISTENT with medications provided:  NO DRUGS DETECTED  ________________________________________________________________  INTERPRETIVE INFORMATION: Targeted drug profile Interp  Interpretation depends on accuracy and completeness of patient  medication  information submitted by client.       Creatinine, Ur 06/10/2022 294.1  20.0 - 400.0 mg/dL Final    Codeine 06/10/2022 Not Detected   Final    Morphine 06/10/2022 Not Detected   Final    6-Acetylmorphine 06/10/2022 Not Detected   Final    Oxycodone 06/10/2022 Not Detected   Final    Noroxycodone 06/10/2022 Not Detected   Final    Oxymorphone 06/10/2022 Not Detected   Final    Noroxymorphone, Urine 06/10/2022 Not Detected   Final    Hydrocodone 06/10/2022 Not Detected   Final    Norhydrocodone, Urine 06/10/2022 Not Detected   Final    Hydromorphone 06/10/2022 Not Detected   Final    Naloxone 06/10/2022 Not Detected   Final    Buprenorphine 06/10/2022 Not Detected   Final    Norbuprenorphine 06/10/2022 Not Detected   Final    Fentanyl 06/10/2022 Not Detected   Final    Norfentanyl 06/10/2022 Not Detected   Final    Meperidine 06/10/2022 Not Detected   Final    Tapentadol, Urine 06/10/2022 Not Detected   Final    Tapentadol-O-Sulfate, Urine 06/10/2022 Not Detected   Final    Methadone 06/10/2022 Not Detected   Final    Tramadol 06/10/2022 Not Detected   Final    Amphetamine 06/10/2022 Not Detected   Final    Methamphetamine 06/10/2022 Not Detected   Final    MDMA, Urine 06/10/2022 Not Detected   Final    MDA 06/10/2022 Not Detected   Final    MDEA 06/10/2022 Not Detected   Final    Methylphenidate 06/10/2022 Not Detected   Final    Phentermine 06/10/2022 Not Detected   Final Benzoylecgonine 06/10/2022 Not Detected   Final    Alprazolam 06/10/2022 Not Detected   Final    Alpha-OH-alprazolam 06/10/2022 Not Detected   Final    Clonazepam 06/10/2022 Not Detected   Final    7-aminoclonazepam 06/10/2022 Not Detected   Final    Diazepam 06/10/2022 Not Detected   Final    Nordiazepam 06/10/2022 Not Detected   Final    OXAZEPAM 06/10/2022 Not Detected   Final    TEMAZEPAM 06/10/2022 Not Detected   Final    Lorazepam 06/10/2022 Not Detected   Final    Midazolam 06/10/2022 Not Detected   Final    Zolpidem 06/10/2022 Not Detected   Final    Gabapentin 06/10/2022 Not Detected   Final    Pregabalin 06/10/2022 Not Detected   Final    Alpha-OH-Midazolam, Urine 06/10/2022 Not Detected   Final    Barbiturates 06/10/2022 Not Detected   Final    Ethyl Glucuronide 06/10/2022 Not Detected   Final    Marijuana Metabolite 06/10/2022 Not Detected   Final    PCP 06/10/2022 Not Detected   Final    CARISOPRODOL 06/10/2022 Not Detected   Final    Comment: The carisoprodol immunoassay has cross-reactivity to carisoprodol and  meprobamate. Pain Management Drug Panel 06/10/2022 See Below   Final    Comment: Methodology: Qualitative Enzyme Immunoassay and Qualitative Liquid  Chromatography-Tandem Mass Spectrometry, Quantitative Spectrophotometry  The absence of expected drug(s) and/or drug metabolite(s) may indicate  non-compliance, inappropriate timing of specimen collection relative to  drug  administration, poor drug absorption, diluted/adulterated urine, or  limitations of testing. The concentration must be greater than or equal  to  the cutoff to be reported as present. If specific drug concentrations  are  required, contact the laboratory within two weeks of specimen  collection to  request quantification by a second analytical technique. Interpretive  questions should be directed to the laboratory.   Results based on immunoassay detection that do not match clinical  expectations should be  interpreted with caution. Confirmatory testing by mass spectrometry for  immunoassay-based results is available, if ordered within two weeks of  specimen collection. Additional charges apply. For                            medical purposes only; not valid for forensic use. This test was developed and its performance characteristics determined  by  [facility]. It has not been cleared or approved by the [employer]  and  Drug Administration. This test was performed in a CLIA certified  laboratory  and is intended for clinical purposes. [facility] Pain Mgt Drug Panel, High Res/* 06/10/2022 See Note   Final    Comment: Authorized individuals can access the [facility]  Enhanced Report using the following link:    [email or web address]  Performed By: [facility] [address]  [place], [address] [facility]  : Lisa Higuera. Nataliya Nye MD         Family History   Problem Relation Age of Onset    Liver Disease Mother     Diabetes Father     Diabetes Sister     Other Sister        Current Outpatient Medications   Medication Sig Dispense Refill    sertraline (ZOLOFT) 50 MG tablet Take 1 tablet by mouth daily 30 tablet 1    buPROPion (WELLBUTRIN SR) 200 MG extended release tablet Take 1 tablet by mouth 2 times daily 180 tablet 1    atomoxetine (STRATTERA) 40 MG capsule Take 1 capsule by mouth 2 times daily 60 capsule 5    Multiple Vitamin (MULTI-DAY PO) Take 1 tablet by mouth daily      finasteride (PROSCAR) 5 MG tablet Take 5 mg by mouth daily      estradiol (ESTRACE) 2 MG tablet Take 2 mg by mouth 4 tablets daily      spironolactone (ALDACTONE) 100 MG tablet Take 100 mg by mouth 2 times daily       No current facility-administered medications for this visit.        Allergies   Allergen Reactions    Duloxetine Hcl Other (See Comments)     tingling      Graphite Nausea And Vomiting and Other (See Comments)     Exposure causes nausea   Other reaction(s): Headaches, Other (See Comments)  Exposure causes nausea Review of Systems   Constitutional:  Negative for chills and fever. Gastrointestinal:  Negative for nausea. Neurological:  Negative for headaches. Psychiatric/Behavioral:  Positive for decreased concentration, dysphoric mood and sleep disturbance. Negative for suicidal ideas. The patient is not nervous/anxious. Vitals:  /60 (Site: Left Upper Arm, Position: Sitting, Cuff Size: Large Adult)   Pulse (!) 102   Temp 98.1 °F (36.7 °C) (Oral)   Wt 188 lb 2 oz (85.3 kg)   SpO2 100%     Physical Exam  Vitals reviewed. Constitutional:       General: She is not in acute distress. HENT:      Head: Normocephalic and atraumatic. Pulmonary:      Effort: Pulmonary effort is normal.   Skin:     General: Skin is warm and dry. Neurological:      General: No focal deficit present. Mental Status: She is alert and oriented to person, place, and time. Psychiatric:         Mood and Affect: Mood normal.         Behavior: Behavior normal.         Thought Content: Thought content normal.         Judgment: Judgment normal.       Assessment/Plan     1. Major depressive disorder, recurrent episode with anxious distress ([facility] [address]): uncontrolled. Denies SI.   - sertraline (ZOLOFT) 50 MG tablet; Take 1 tablet by mouth daily  Dispense: 30 tablet; Refill: 1   - Continue bupropion   - Commence sertraline. Medication education provided. - Continue follow up with Dr Fe Delgadillo   - Follow up 6 weeks, sooner if needed    2. Attention deficit hyperactivity disorder (ADHD), unspecified ADHD type: uncontrolled. Has had benefit from strattera. - Continue bupropion and Strattera  - Schedule with psychiatry NP  - Continue seeing Dr Fe Delgadillo     3. Sleep disturbance: c/o nightmares disrupting sleep. Suspect PTSD. - Starting SSRI as above   - May benefit from prazosin in the future. Will continue with therapy at present.     - Schedule with psychiatry NP    No orders of the defined types were placed in this encounter. Return in about 6 weeks (around 2/21/2023) for depression . OR sooner with questions, concerns, worsening symptoms    CUONG ELENA, RAVEN  1/10/2023  8:31 AM    Discussed use, benefit, and side effects of prescribed medications. Barriers to medication compliance addressed. Discussed all ordered testing and labs. All patient questions answered. Patient agreeable with plan above. Please note that this chart was generated using dragon dictation software. Although every effort was made to ensure the accuracy of this automated transcription, some errors in transcription may have occurred.

## 2023-01-10 ENCOUNTER — OFFICE VISIT (OUTPATIENT)
Dept: INTERNAL MEDICINE CLINIC | Age: 28
End: 2023-01-10
Payer: COMMERCIAL

## 2023-01-10 VITALS
SYSTOLIC BLOOD PRESSURE: 110 MMHG | OXYGEN SATURATION: 100 % | TEMPERATURE: 98.1 F | HEART RATE: 102 BPM | DIASTOLIC BLOOD PRESSURE: 60 MMHG | WEIGHT: 188.13 LBS

## 2023-01-10 DIAGNOSIS — F90.9 ATTENTION DEFICIT HYPERACTIVITY DISORDER (ADHD), UNSPECIFIED ADHD TYPE: ICD-10-CM

## 2023-01-10 DIAGNOSIS — G47.9 SLEEP DISTURBANCE: ICD-10-CM

## 2023-01-10 DIAGNOSIS — F33.9 MAJOR DEPRESSIVE DISORDER, RECURRENT EPISODE WITH ANXIOUS DISTRESS (HCC): Primary | ICD-10-CM

## 2023-01-10 PROCEDURE — 99214 OFFICE O/P EST MOD 30 MIN: CPT | Performed by: NURSE PRACTITIONER

## 2023-01-10 ASSESSMENT — PATIENT HEALTH QUESTIONNAIRE - PHQ9
10. IF YOU CHECKED OFF ANY PROBLEMS, HOW DIFFICULT HAVE THESE PROBLEMS MADE IT FOR YOU TO DO YOUR WORK, TAKE CARE OF THINGS AT HOME, OR GET ALONG WITH OTHER PEOPLE: 1
SUM OF ALL RESPONSES TO PHQ QUESTIONS 1-9: 6
SUM OF ALL RESPONSES TO PHQ9 QUESTIONS 1 & 2: 2
SUM OF ALL RESPONSES TO PHQ QUESTIONS 1-9: 6
1. LITTLE INTEREST OR PLEASURE IN DOING THINGS: 1
3. TROUBLE FALLING OR STAYING ASLEEP: 1
2. FEELING DOWN, DEPRESSED OR HOPELESS: 1
SUM OF ALL RESPONSES TO PHQ QUESTIONS 1-9: 6
6. FEELING BAD ABOUT YOURSELF - OR THAT YOU ARE A FAILURE OR HAVE LET YOURSELF OR YOUR FAMILY DOWN: 1
SUM OF ALL RESPONSES TO PHQ QUESTIONS 1-9: 6
8. MOVING OR SPEAKING SO SLOWLY THAT OTHER PEOPLE COULD HAVE NOTICED. OR THE OPPOSITE, BEING SO FIGETY OR RESTLESS THAT YOU HAVE BEEN MOVING AROUND A LOT MORE THAN USUAL: 0
5. POOR APPETITE OR OVEREATING: 0
4. FEELING TIRED OR HAVING LITTLE ENERGY: 0
7. TROUBLE CONCENTRATING ON THINGS, SUCH AS READING THE NEWSPAPER OR WATCHING TELEVISION: 2
9. THOUGHTS THAT YOU WOULD BE BETTER OFF DEAD, OR OF HURTING YOURSELF: 0

## 2023-01-10 ASSESSMENT — ENCOUNTER SYMPTOMS: NAUSEA: 0

## 2023-01-10 NOTE — PATIENT INSTRUCTIONS
Schedule with NP Roosevelt Pringle   Start sertraline, continue other medications and follow up in 6 weeks

## 2023-02-10 ENCOUNTER — OFFICE VISIT (OUTPATIENT)
Dept: PSYCHOLOGY | Age: 28
End: 2023-02-10
Payer: COMMERCIAL

## 2023-02-10 DIAGNOSIS — F90.2 ATTENTION DEFICIT HYPERACTIVITY DISORDER (ADHD), COMBINED TYPE: ICD-10-CM

## 2023-02-10 DIAGNOSIS — F34.1 PERSISTENT DEPRESSIVE DISORDER WITH MIXED FEATURES, CURRENTLY MODERATE: Primary | ICD-10-CM

## 2023-02-10 PROCEDURE — 90832 PSYTX W PT 30 MINUTES: CPT | Performed by: PSYCHOLOGIST

## 2023-02-10 NOTE — PROGRESS NOTES
Behavioral Health Consultation  Ngoc Carrera, Ph.D.  Psychologist  2/10/2023  8:30 AM EST      Time spent with Patient: 45 minutes  This is patient's  sixteenth  [facility] appointment. Reason for Consult: depression; anxiety; r/o PTSD  Referring Provider: Oswaldo Yee, [address] [address]    Feedback for PCP:     Pt provided informed consent for the behavioral health program. Discussed with patient model of service to include the limits of confidentiality (i.e. abuse reporting, suicide intervention, etc.) and short-term intervention focused approach. Pt indicated understanding. Feedback given to PCP. S:  The patient's PHQ was dramatically lower this visit than ones from recent visits. The patient reports that she has had a few good talks with her mother, and that her mother \"stopped denying that she caused me pain. \" She was able to tell that \"I had to be your mom when I needed one. \" She said that her mother apologized and acknowledged that her behavior was inappropriate and selfish. She says that she is \"cautiously optimistic\" that her mother's changed behavior would continue.     Social History     Tobacco Use    Smoking status: Never    Smokeless tobacco: Never   Substance Use Topics    Alcohol use: Not Currently        Illicit drugs:   Social History     Substance and Sexual Activity   Drug Use Not Currently        O:  MSE:  Appearance: good hygiene   Attitude: cooperative and friendly  Consciousness: alert  Orientation: oriented to person, place, time, general circumstance  Memory: recent and remote memory intact  Attention/Concentration: intact during session  Psychomotor Activity: normal  Eye Contact: normal  Speech: normal rate and volume, well-articulated  Mood: less anxious  Affect: congruent  Perception: within normal limits  Thought Content: within normal limits  Thought Process: logical, coherent and goal-directed  Insight: good  Judgment: intact  Ability to understand instructions: Yes  Ability to respond meaningfully: Yes  Morbid Ideation: passive thoughts of death  Suicide Assessment: suicidal ideation without plan or intent  Homicidal Ideation: no    A:  The possible breakthrough with her mother has buoyed the patient's mood, especially when she realized that she had not problem being assertive and telling her mother her true thoughts and feeliings. DANE 7 SCORE 11/4/2022 10/7/2022 9/9/2022 7/1/2022 6/24/2022 6/17/2022 5/27/2022   DANE-7 Total Score 14 14 12 17 14 14 12     Interpretation of DANE-7 score: 5-9 = mild anxiety, 10-14 = moderate anxiety, 15+ = severe anxiety. Recommend referral to behavioral health for scores 10 or greater. PHQ Scores 1/10/2023 11/4/2022 10/7/2022 9/9/2022 7/1/2022 6/24/2022 6/17/2022   PHQ2 Score 2 4 5 5 6 4 4   PHQ9 Score 6 18 15 15 24 15 18     Interpretation of Total Score Depression Severity: 1-4 = Minimal depression, 5-9 = Mild depression, 10-14 = Moderate depression, 15-19 = Moderately severe depression, 20-27 = Severe depression    Diagnosis:    1. Persistent depressive disorder with mixed features, currently moderate    2. Attention deficit hyperactivity disorder (ADHD), combined type                    Patient Active Problem List   Diagnosis    Attention deficit hyperactivity disorder (ADHD)    Persistent depressive disorder with mixed features, currently moderate         Plan:  Pt interventions:  Supportive techniques, Provided Psychoeducation re: treating anxiety and Emphasized self-care as important for managing overall health.        Pt Behavioral Change Plan:  Pt set the following goals:  Pt scheduled F/U visit in four weeks  See section 'A'

## 2023-02-21 ENCOUNTER — OFFICE VISIT (OUTPATIENT)
Dept: INTERNAL MEDICINE CLINIC | Age: 28
End: 2023-02-21
Payer: COMMERCIAL

## 2023-02-21 VITALS
TEMPERATURE: 98.5 F | HEART RATE: 70 BPM | WEIGHT: 183.25 LBS | SYSTOLIC BLOOD PRESSURE: 114 MMHG | DIASTOLIC BLOOD PRESSURE: 78 MMHG

## 2023-02-21 DIAGNOSIS — F90.2 ATTENTION DEFICIT HYPERACTIVITY DISORDER (ADHD), COMBINED TYPE: ICD-10-CM

## 2023-02-21 DIAGNOSIS — Z00.00 PREVENTATIVE HEALTH CARE: ICD-10-CM

## 2023-02-21 DIAGNOSIS — F33.9 MAJOR DEPRESSIVE DISORDER, RECURRENT EPISODE WITH ANXIOUS DISTRESS (HCC): Primary | ICD-10-CM

## 2023-02-21 LAB
A/G RATIO: 1.7 (ref 1.1–2.2)
ALBUMIN SERPL-MCNC: 4 G/DL (ref 3.4–5)
ALP BLD-CCNC: 97 U/L (ref 40–129)
ALT SERPL-CCNC: 9 U/L (ref 10–40)
ANION GAP SERPL CALCULATED.3IONS-SCNC: 10 MMOL/L (ref 3–16)
AST SERPL-CCNC: 12 U/L (ref 15–37)
BASOPHILS ABSOLUTE: 0 K/UL (ref 0–0.2)
BASOPHILS RELATIVE PERCENT: 0.8 %
BILIRUB SERPL-MCNC: 0.7 MG/DL (ref 0–1)
BUN BLDV-MCNC: 15 MG/DL (ref 7–20)
CALCIUM SERPL-MCNC: 9.2 MG/DL (ref 8.3–10.6)
CHLORIDE BLD-SCNC: 101 MMOL/L (ref 99–110)
CO2: 27 MMOL/L (ref 21–32)
CREAT SERPL-MCNC: 1 MG/DL (ref 0.6–1.1)
EOSINOPHILS ABSOLUTE: 0.2 K/UL (ref 0–0.6)
EOSINOPHILS RELATIVE PERCENT: 4 %
GFR SERPL CREATININE-BSD FRML MDRD: >60 ML/MIN/{1.73_M2}
GLUCOSE BLD-MCNC: 95 MG/DL (ref 70–99)
HCT VFR BLD CALC: 38.7 % (ref 36–48)
HEMOGLOBIN: 13.8 G/DL (ref 12–16)
LYMPHOCYTES ABSOLUTE: 2.1 K/UL (ref 1–5.1)
LYMPHOCYTES RELATIVE PERCENT: 40.9 %
MCH RBC QN AUTO: 31.8 PG (ref 26–34)
MCHC RBC AUTO-ENTMCNC: 35.6 G/DL (ref 31–36)
MCV RBC AUTO: 89.4 FL (ref 80–100)
MONOCYTES ABSOLUTE: 0.2 K/UL (ref 0–1.3)
MONOCYTES RELATIVE PERCENT: 3.8 %
NEUTROPHILS ABSOLUTE: 2.6 K/UL (ref 1.7–7.7)
NEUTROPHILS RELATIVE PERCENT: 50.5 %
PDW BLD-RTO: 12.7 % (ref 12.4–15.4)
PLATELET # BLD: 246 K/UL (ref 135–450)
PMV BLD AUTO: 8.5 FL (ref 5–10.5)
POTASSIUM SERPL-SCNC: 4.4 MMOL/L (ref 3.5–5.1)
RBC # BLD: 4.33 M/UL (ref 4–5.2)
SODIUM BLD-SCNC: 138 MMOL/L (ref 136–145)
TOTAL PROTEIN: 6.4 G/DL (ref 6.4–8.2)
TSH REFLEX: 4.06 UIU/ML (ref 0.27–4.2)
VITAMIN D 25-HYDROXY: 18 NG/ML
WBC # BLD: 5.1 K/UL (ref 4–11)

## 2023-02-21 PROCEDURE — 99214 OFFICE O/P EST MOD 30 MIN: CPT | Performed by: NURSE PRACTITIONER

## 2023-02-21 SDOH — ECONOMIC STABILITY: FOOD INSECURITY: WITHIN THE PAST 12 MONTHS, YOU WORRIED THAT YOUR FOOD WOULD RUN OUT BEFORE YOU GOT MONEY TO BUY MORE.: NEVER TRUE

## 2023-02-21 SDOH — ECONOMIC STABILITY: FOOD INSECURITY: WITHIN THE PAST 12 MONTHS, THE FOOD YOU BOUGHT JUST DIDN'T LAST AND YOU DIDN'T HAVE MONEY TO GET MORE.: NEVER TRUE

## 2023-02-21 SDOH — ECONOMIC STABILITY: HOUSING INSECURITY
IN THE LAST 12 MONTHS, WAS THERE A TIME WHEN YOU DID NOT HAVE A STEADY PLACE TO SLEEP OR SLEPT IN A SHELTER (INCLUDING NOW)?: NO

## 2023-02-21 SDOH — ECONOMIC STABILITY: INCOME INSECURITY: HOW HARD IS IT FOR YOU TO PAY FOR THE VERY BASICS LIKE FOOD, HOUSING, MEDICAL CARE, AND HEATING?: NOT VERY HARD

## 2023-02-21 ASSESSMENT — ENCOUNTER SYMPTOMS
VOMITING: 0
NAUSEA: 0
ABDOMINAL PAIN: 0

## 2023-02-21 NOTE — PROGRESS NOTES
Date: 2/21/2023                                               Subjective/Objective:     Chief Complaint   Patient presents with    Depression    Anxiety     6 week follow up       HPI    Hermelindo Barone is a 32 yo female, visit today for follow up on ADHD, depression. She is s/p laparoscopic cholecystectomy 1/2023. ADHD managed on Strattera and bupropion. She has noticed decrease in appetite on medication. She c/o dry mouth. Denies other medication side effects. She feels that medication has been somewhat effective. Feels like she isn't having as much benefit as she had previously. Continues to be easily distracted. Depression managed on bupropion and sertraline. At last OV she felt she had lessened effect from bupropion than she had previously. She was commenced on sertraline at that time-however she was confused on this and has not started the medication. Denies SI. Follows with Dr. Hilda Savage. Previously taken duloxetine which she reports she was allergic to. She has previous taking Lexapro which she felt blunted her emotions. 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 Followed by [facility] . She is currently managed on estradiol, spironolactone, progesterone and finasteride.          Patient Active Problem List    Diagnosis Date Noted    Persistent depressive disorder with mixed features, currently moderate 10/11/2022    Attention deficit hyperactivity disorder (ADHD) 06/06/2022       Past Medical History:   Diagnosis Date    Anxiety     Depression     Gender dysphoria        Past Surgical History:   Procedure Laterality Date    CHOLECYSTECTOMY         Nurse Only on 06/10/2022   Component Date Value Ref Range Status    Drugs Expected 06/10/2022 see attached   Final    Pain Management Drug Panel 06/10/2022 Consistent   Final    Comment: ________________________________________________________________  DRUGS EXPECTED:  NO APPLICABLE DRUGS PROVIDED  ________________________________________________________________  CONSISTENT with medications provided:  NO DRUGS DETECTED  ________________________________________________________________  INTERPRETIVE INFORMATION: Targeted drug profile Interp  Interpretation depends on accuracy and completeness of patient  medication  information submitted by client.       Creatinine, Ur 06/10/2022 294.1  20.0 - 400.0 mg/dL Final    Codeine 06/10/2022 Not Detected   Final    Morphine 06/10/2022 Not Detected   Final    6-Acetylmorphine 06/10/2022 Not Detected   Final    Oxycodone 06/10/2022 Not Detected   Final    Noroxycodone 06/10/2022 Not Detected   Final    Oxymorphone 06/10/2022 Not Detected   Final    Noroxymorphone, Urine 06/10/2022 Not Detected   Final    Hydrocodone 06/10/2022 Not Detected   Final    Norhydrocodone, Urine 06/10/2022 Not Detected   Final    Hydromorphone 06/10/2022 Not Detected   Final    Naloxone 06/10/2022 Not Detected   Final    Buprenorphine 06/10/2022 Not Detected   Final    Norbuprenorphine 06/10/2022 Not Detected   Final    Fentanyl 06/10/2022 Not Detected   Final    Norfentanyl 06/10/2022 Not Detected   Final    Meperidine 06/10/2022 Not Detected   Final    Tapentadol, Urine 06/10/2022 Not Detected   Final    Tapentadol-O-Sulfate, Urine 06/10/2022 Not Detected   Final    Methadone 06/10/2022 Not Detected   Final    Tramadol 06/10/2022 Not Detected   Final    Amphetamine 06/10/2022 Not Detected   Final    Methamphetamine 06/10/2022 Not Detected   Final    MDMA, Urine 06/10/2022 Not Detected   Final    MDA 06/10/2022 Not Detected   Final    MDEA 06/10/2022 Not Detected   Final    Methylphenidate 06/10/2022 Not Detected   Final    Phentermine 06/10/2022 Not Detected   Final    Benzoylecgonine 06/10/2022 Not Detected   Final    Alprazolam 06/10/2022 Not Detected   Final    Alpha-OH-alprazolam 06/10/2022 Not Detected   Final    Clonazepam 06/10/2022 Not Detected   Final    7-aminoclonazepam 06/10/2022 Not Detected   Final    Diazepam 06/10/2022 Not Detected   Final    Nordiazepam 06/10/2022 Not Detected   Final    OXAZEPAM 06/10/2022 Not Detected   Final    TEMAZEPAM 06/10/2022 Not Detected   Final    Lorazepam 06/10/2022 Not Detected   Final    Midazolam 06/10/2022 Not Detected   Final    Zolpidem 06/10/2022 Not Detected   Final    Gabapentin 06/10/2022 Not Detected   Final    Pregabalin 06/10/2022 Not Detected   Final    Alpha-OH-Midazolam, Urine 06/10/2022 Not Detected   Final    Barbiturates 06/10/2022 Not Detected   Final    Ethyl Glucuronide 06/10/2022 Not Detected   Final    Marijuana Metabolite 06/10/2022 Not Detected   Final    PCP 06/10/2022 Not Detected   Final    CARISOPRODOL 06/10/2022 Not Detected   Final    Comment: The carisoprodol immunoassay has cross-reactivity to carisoprodol and  meprobamate. Pain Management Drug Panel 06/10/2022 See Below   Final    Comment: Methodology: Qualitative Enzyme Immunoassay and Qualitative Liquid  Chromatography-Tandem Mass Spectrometry, Quantitative Spectrophotometry  The absence of expected drug(s) and/or drug metabolite(s) may indicate  non-compliance, inappropriate timing of specimen collection relative to  drug  administration, poor drug absorption, diluted/adulterated urine, or  limitations of testing. The concentration must be greater than or equal  to  the cutoff to be reported as present. If specific drug concentrations  are  required, contact the laboratory within two weeks of specimen  collection to  request quantification by a second analytical technique. Interpretive  questions should be directed to the laboratory. Results based on immunoassay detection that do not match clinical  expectations should be  interpreted with caution. Confirmatory testing by mass spectrometry for  immunoassay-based results is available, if ordered within two weeks of  specimen collection. Additional charges apply.   For                            medical purposes only; not valid for forensic use. This test was developed and its performance characteristics determined  by  [employer]. It has not been cleared or approved by the [employer]  and  Drug Administration. This test was performed in a CLIA certified  laboratory  and is intended for clinical purposes. EER Pain Mgt Drug Panel, High Res/* 06/10/2022 See Note   Final    Comment: Authorized individuals can access the [facility]  Enhanced Report using the following link:    [email or web address] [employer] [email or web address]  Performed By: [address]  [place], [address] [facility]  : Marcus Simmons. Cain Lyles MD         Family History   Problem Relation Age of Onset    Liver Disease Mother     Diabetes Father     Diabetes Sister     Other Sister        Current Outpatient Medications   Medication Sig Dispense Refill    sertraline (ZOLOFT) 50 MG tablet Take 1 tablet by mouth daily 30 tablet 1    buPROPion (WELLBUTRIN SR) 200 MG extended release tablet Take 1 tablet by mouth 2 times daily 180 tablet 1    atomoxetine (STRATTERA) 40 MG capsule Take 1 capsule by mouth 2 times daily 60 capsule 5    Multiple Vitamin (MULTI-DAY PO) Take 1 tablet by mouth daily      finasteride (PROSCAR) 5 MG tablet Take 5 mg by mouth daily      estradiol (ESTRACE) 2 MG tablet Take 2 mg by mouth 4 tablets daily      spironolactone (ALDACTONE) 100 MG tablet Take 100 mg by mouth 2 times daily       No current facility-administered medications for this visit. Allergies   Allergen Reactions    Duloxetine Hcl Other (See Comments)     tingling      Graphite Nausea And Vomiting and Other (See Comments)     Exposure causes nausea   Other reaction(s): Headaches, Other (See Comments)  Exposure causes nausea          Review of Systems   Constitutional:  Negative for chills and fever. Gastrointestinal:  Negative for abdominal pain, nausea and vomiting. Neurological:  Negative for dizziness.    Psychiatric/Behavioral: Positive for decreased concentration, dysphoric mood and sleep disturbance. Negative for suicidal ideas. The patient is not nervous/anxious. Vitals:  /78 (Site: Left Upper Arm, Position: Sitting, Cuff Size: Large Adult)   Pulse 70   Temp 98.5 °F (36.9 °C) (Oral)   Wt 183 lb 4 oz (83.1 kg)     Physical Exam  Vitals reviewed. Constitutional:       General: She is not in acute distress. HENT:      Head: Normocephalic and atraumatic. Pulmonary:      Effort: Pulmonary effort is normal.   Skin:     General: Skin is warm and dry. Neurological:      Mental Status: She is alert and oriented to person, place, and time. Psychiatric:         Behavior: Behavior normal.       Assessment/Plan     1. Major depressive disorder, recurrent episode with anxious distress ([address]): uncontrolled. She was not clear on plan following last OV and did not start sertraline. - sertraline (ZOLOFT) 50 MG tablet; Take 1 tablet by mouth daily  Dispense: 30 tablet; Refill: 1    - Continue bupropion, commence sertraline. Medication education provided  - Follow up 6 weeks, sooner if needed     2. Attention deficit hyperactivity disorder (ADHD), combined type: improving.    - Continue strattera and bupropion. See psychiatry as scheduled    3. Preventative health care  - Comprehensive Metabolic Panel; Future  - CBC with Auto Differential; Future  - TSH with Reflex; Future  - Vitamin D 25 Hydroxy;  Future    Orders Placed This Encounter   Procedures    Comprehensive Metabolic Panel     Standing Status:   Future     Number of Occurrences:   1     Standing Expiration Date:   2/21/2024    CBC with Auto Differential     Standing Status:   Future     Number of Occurrences:   1     Standing Expiration Date:   2/21/2024    TSH with Reflex     Standing Status:   Future     Number of Occurrences:   1     Standing Expiration Date:   2/21/2024    Vitamin D 25 Hydroxy     Standing Status:   Future     Number of Occurrences:   1     Standing Expiration Date:   2/21/2024       Return in about 6 weeks (around 4/4/2023). OR sooner with questions, concerns, worsening symptoms    RAVEN GLASGOW  2/21/2023  9:57 AM    Discussed use, benefit, and side effects of prescribed medications. Barriers to medication compliance addressed. Discussed all ordered testing and labs. All patient questions answered. Patient agreeable with plan above. Please note that this chart was generated using dragon dictation software. Although every effort was made to ensure the accuracy of this automated transcription, some errors in transcription may have occurred.

## 2023-02-24 ENCOUNTER — OFFICE VISIT (OUTPATIENT)
Dept: PSYCHOLOGY | Age: 28
End: 2023-02-24
Payer: COMMERCIAL

## 2023-02-24 DIAGNOSIS — F34.1 PERSISTENT DEPRESSIVE DISORDER WITH MIXED FEATURES, CURRENTLY MODERATE: Primary | ICD-10-CM

## 2023-02-24 PROCEDURE — 90832 PSYTX W PT 30 MINUTES: CPT | Performed by: PSYCHOLOGIST

## 2023-02-24 ASSESSMENT — ANXIETY QUESTIONNAIRES
3. WORRYING TOO MUCH ABOUT DIFFERENT THINGS: 3-NEARLY EVERY DAY
4. TROUBLE RELAXING: 3-NEARLY EVERY DAY
7. FEELING AFRAID AS IF SOMETHING AWFUL MIGHT HAPPEN: 0-NOT AT ALL
GAD7 TOTAL SCORE: 13
6. BECOMING EASILY ANNOYED OR IRRITABLE: 1-SEVERAL DAYS
1. FEELING NERVOUS, ANXIOUS, OR ON EDGE: 2
2. NOT BEING ABLE TO STOP OR CONTROL WORRYING: 1-SEVERAL DAYS
5. BEING SO RESTLESS THAT IT IS HARD TO SIT STILL: 3-NEARLY EVERY DAY

## 2023-02-24 ASSESSMENT — PATIENT HEALTH QUESTIONNAIRE - PHQ9
4. FEELING TIRED OR HAVING LITTLE ENERGY: 3
9. THOUGHTS THAT YOU WOULD BE BETTER OFF DEAD, OR OF HURTING YOURSELF: 0
7. TROUBLE CONCENTRATING ON THINGS, SUCH AS READING THE NEWSPAPER OR WATCHING TELEVISION: 2
3. TROUBLE FALLING OR STAYING ASLEEP: 3
1. LITTLE INTEREST OR PLEASURE IN DOING THINGS: 2
8. MOVING OR SPEAKING SO SLOWLY THAT OTHER PEOPLE COULD HAVE NOTICED. OR THE OPPOSITE, BEING SO FIGETY OR RESTLESS THAT YOU HAVE BEEN MOVING AROUND A LOT MORE THAN USUAL: 2
SUM OF ALL RESPONSES TO PHQ QUESTIONS 1-9: 17
SUM OF ALL RESPONSES TO PHQ9 QUESTIONS 1 & 2: 5
2. FEELING DOWN, DEPRESSED OR HOPELESS: 3
SUM OF ALL RESPONSES TO PHQ QUESTIONS 1-9: 17
5. POOR APPETITE OR OVEREATING: 1
SUM OF ALL RESPONSES TO PHQ QUESTIONS 1-9: 17
SUM OF ALL RESPONSES TO PHQ QUESTIONS 1-9: 17
6. FEELING BAD ABOUT YOURSELF - OR THAT YOU ARE A FAILURE OR HAVE LET YOURSELF OR YOUR FAMILY DOWN: 1

## 2023-02-24 NOTE — PROGRESS NOTES
Behavioral Health Consultation  Marsha Healy, Ph.D.  Psychologist  2/24/2023  8:30 AM EST      Time spent with Patient: 30 minutes  This is patient's  sixteenth  [facility] appointment. Reason for Consult: depression; anxiety; r/o PTSD  Referring Provider: Sean Craig, [address] [facility] [address] [place] [id number]    Feedback for PCP:     Pt provided informed consent for the behavioral health program. Discussed with patient model of service to include the limits of confidentiality (i.e. abuse reporting, suicide intervention, etc.) and short-term intervention focused approach. Pt indicated understanding. Feedback given to PCP. S:  The patient reports that her relationship with her mother continues to seem healthy to her and that her mother is respecting her boundaries. She says that her mother gives her money for things and that his reminds her of the dynamic when she was growing up where money was a substitute for time and attention. She is suspicious of strings attached giving. She also described improved emotional boundaries with Nadyne Scale, when she can notice anxiety and ask herself, \"whose feelings ar these? \"    Social History     Tobacco Use    Smoking status: Never    Smokeless tobacco: Never   Substance Use Topics    Alcohol use: Not Currently        Illicit drugs:   Social History     Substance and Sexual Activity   Drug Use Not Currently        O:  MSE:  Appearance: good hygiene   Attitude: cooperative and friendly  Consciousness: alert  Orientation: oriented to person, place, time, general circumstance  Memory: recent and remote memory intact  Attention/Concentration: intact during session  Psychomotor Activity: normal  Eye Contact: normal  Speech: normal rate and volume, well-articulated  Mood: less anxious  Affect: congruent  Perception: within normal limits  Thought Content: within normal limits  Thought Process: logical, coherent and goal-directed  Insight: good  Judgment: intact  Ability to understand instructions: Yes  Ability to respond meaningfully: Yes  Morbid Ideation: passive thoughts of death  Suicide Assessment: suicidal ideation without plan or intent  Homicidal Ideation: no    A:  Although her PHQ and DANE are still high she presents and talks about improved emotion regulation skills. She is establishing and holding boundaries with both her mother and Monica Mahmood. DANE 7 SCORE 2/24/2023 11/4/2022 10/7/2022 9/9/2022 7/1/2022 6/24/2022 6/17/2022   DANE-7 Total Score 13 14 14 12 17 14 14     Interpretation of DANE-7 score: 5-9 = mild anxiety, 10-14 = moderate anxiety, 15+ = severe anxiety. Recommend referral to behavioral health for scores 10 or greater. PHQ Scores 2/24/2023 1/10/2023 11/4/2022 10/7/2022 9/9/2022 7/1/2022 6/24/2022   PHQ2 Score 5 2 4 5 5 6 4   PHQ9 Score 17 6 18 15 15 24 15     Interpretation of Total Score Depression Severity: 1-4 = Minimal depression, 5-9 = Mild depression, 10-14 = Moderate depression, 15-19 = Moderately severe depression, 20-27 = Severe depression    Diagnosis:    1. Persistent depressive disorder with mixed features, currently moderate                      Patient Active Problem List   Diagnosis    Attention deficit hyperactivity disorder (ADHD)    Persistent depressive disorder with mixed features, currently moderate         Plan:  Pt interventions:  Supportive techniques, Provided Psychoeducation re: treating anxiety and Emphasized self-care as important for managing overall health.        Pt Behavioral Change Plan:  Pt set the following goals:  Pt scheduled F/U visit in four weeks  See section 'A'

## 2023-04-04 ENCOUNTER — OFFICE VISIT (OUTPATIENT)
Dept: INTERNAL MEDICINE CLINIC | Age: 28
End: 2023-04-04
Payer: COMMERCIAL

## 2023-04-04 VITALS
DIASTOLIC BLOOD PRESSURE: 72 MMHG | TEMPERATURE: 97.7 F | SYSTOLIC BLOOD PRESSURE: 118 MMHG | WEIGHT: 184.13 LBS | HEART RATE: 72 BPM

## 2023-04-04 DIAGNOSIS — F90.2 ATTENTION DEFICIT HYPERACTIVITY DISORDER (ADHD), COMBINED TYPE: ICD-10-CM

## 2023-04-04 DIAGNOSIS — F33.9 MAJOR DEPRESSIVE DISORDER, RECURRENT EPISODE WITH ANXIOUS DISTRESS (HCC): Primary | ICD-10-CM

## 2023-04-04 PROCEDURE — 99214 OFFICE O/P EST MOD 30 MIN: CPT | Performed by: NURSE PRACTITIONER

## 2023-04-04 RX ORDER — SERTRALINE HYDROCHLORIDE 100 MG/1
100 TABLET, FILM COATED ORAL DAILY
Qty: 30 TABLET | Refills: 2 | Status: SHIPPED | OUTPATIENT
Start: 2023-04-04

## 2023-04-04 ASSESSMENT — ENCOUNTER SYMPTOMS
VOMITING: 0
NAUSEA: 0

## 2023-04-04 NOTE — PROGRESS NOTES
mg/dL Final    BUN 02/21/2023 15  7 - 20 mg/dL Final    Creatinine 02/21/2023 1.0  0.6 - 1.1 mg/dL Final    Est, Glom Filt Rate 02/21/2023 >60  >60 Final    Comment: Pediatric calculator link  Aye.at. org/professionals/kdoqi/gfr_calculatorped  Effective Oct 3, 2022  These results are not intended for use in patients  <25years of age. eGFR results are calculated without  a race factor using the 2021 CKD-EPI equation. Careful  clinical correlation is recommended, particularly when  comparing to results calculated using previous equations. The CKD-EPI equation is less accurate in patients with  extremes of muscle mass, extra-renal metabolism of  creatinine, excessive creatinine ingestion, or following  therapy that affects renal tubular secretion.       Calcium 02/21/2023 9.2  8.3 - 10.6 mg/dL Final    Total Protein 02/21/2023 6.4  6.4 - 8.2 g/dL Final    Albumin 02/21/2023 4.0  3.4 - 5.0 g/dL Final    Albumin/Globulin Ratio 02/21/2023 1.7  1.1 - 2.2 Final    Total Bilirubin 02/21/2023 0.7  0.0 - 1.0 mg/dL Final    Alkaline Phosphatase 02/21/2023 97  40 - 129 U/L Final    ALT 02/21/2023 9 (L)  10 - 40 U/L Final    AST 02/21/2023 12 (L)  15 - 37 U/L Final    WBC 02/21/2023 5.1  4.0 - 11.0 K/uL Final    RBC 02/21/2023 4.33  4.00 - 5.20 M/uL Final    Hemoglobin 02/21/2023 13.8  12.0 - 16.0 g/dL Final    Hematocrit 02/21/2023 38.7  36.0 - 48.0 % Final    MCV 02/21/2023 89.4  80.0 - 100.0 fL Final    MCH 02/21/2023 31.8  26.0 - 34.0 pg Final    MCHC 02/21/2023 35.6  31.0 - 36.0 g/dL Final    RDW 02/21/2023 12.7  12.4 - 15.4 % Final    Platelets 27/17/3698 246  135 - 450 K/uL Final    MPV 02/21/2023 8.5  5.0 - 10.5 fL Final    Neutrophils % 02/21/2023 50.5  % Final    Lymphocytes % 02/21/2023 40.9  % Final    Monocytes % 02/21/2023 3.8  % Final    Eosinophils % 02/21/2023 4.0  % Final    Basophils % 02/21/2023 0.8  % Final    Neutrophils Absolute 02/21/2023 2.6  1.7 - 7.7 K/uL Final    Lymphocytes Absolute

## 2023-05-05 ENCOUNTER — OFFICE VISIT (OUTPATIENT)
Dept: PSYCHOLOGY | Age: 28
End: 2023-05-05
Payer: COMMERCIAL

## 2023-05-05 DIAGNOSIS — F34.1 PERSISTENT DEPRESSIVE DISORDER WITH MIXED FEATURES, CURRENTLY MODERATE: Primary | ICD-10-CM

## 2023-05-05 PROCEDURE — 90832 PSYTX W PT 30 MINUTES: CPT | Performed by: PSYCHOLOGIST

## 2023-05-16 DIAGNOSIS — F90.9 ATTENTION DEFICIT HYPERACTIVITY DISORDER (ADHD), UNSPECIFIED ADHD TYPE: ICD-10-CM

## 2023-05-16 RX ORDER — ATOMOXETINE 40 MG/1
CAPSULE ORAL
Qty: 60 CAPSULE | Refills: 0 | Status: SHIPPED | OUTPATIENT
Start: 2023-05-16

## 2023-05-16 RX ORDER — ATOMOXETINE 40 MG/1
40 CAPSULE ORAL 2 TIMES DAILY
Qty: 60 CAPSULE | Refills: 5 | OUTPATIENT
Start: 2023-05-16

## 2023-05-16 NOTE — TELEPHONE ENCOUNTER
Future Appointments   Date Time Provider Leigha Mckeon   5/26/2023  9:00 AM Christina Mcclelland, PhD [facility]   6/8/2023  8:30 AM Karl Manning APRN - CNP [facility]     Last appt on 2.21.2023 same name as above

## 2023-05-26 ENCOUNTER — OFFICE VISIT (OUTPATIENT)
Dept: PSYCHOLOGY | Age: 28
End: 2023-05-26
Payer: COMMERCIAL

## 2023-05-26 DIAGNOSIS — F34.1 PERSISTENT DEPRESSIVE DISORDER WITH MIXED FEATURES, CURRENTLY MODERATE: Primary | ICD-10-CM

## 2023-05-26 PROCEDURE — 90832 PSYTX W PT 30 MINUTES: CPT | Performed by: PSYCHOLOGIST

## 2023-05-26 NOTE — PROGRESS NOTES
Behavioral Health Consultation  Scarlett Valenzuela, Ph.D.  Psychologist  5/26/2023  9:00 AM EST      Time spent with Patient: 30 minutes  This is patient's  twenty-sixth  [facility] appointment. Reason for Consult: depression; anxiety; Referring Provider: Honey Gaxiola, [address] [place] [id number]    Feedback for PCP:     Pt provided informed consent for the behavioral health program. Discussed with patient model of service to include the limits of confidentiality (i.e. abuse reporting, suicide intervention, etc.) and short-term intervention focused approach. Pt indicated understanding. Feedback given to PCP. S:  The patient says that she is trying to manage being in love with Brittany Jacobson, even though he often ignored her at work. He has since got a new job somewhere else. She said that she has also taken up with a new man, who works with her at her new PT job. She said that she doesn't really feel anything for [facility], however, he apparently has spent the last 5 nights at her apartment.      Social History     Tobacco Use    Smoking status: Never    Smokeless tobacco: Never   Substance Use Topics    Alcohol use: Not Currently        Illicit drugs:   Social History     Substance and Sexual Activity   Drug Use Not Currently        O:  MSE:  Appearance: good hygiene   Attitude: cooperative and friendly  Consciousness: alert  Orientation: oriented to person, place, time, general circumstance  Memory: recent and remote memory intact  Attention/Concentration: intact during session  Psychomotor Activity: normal  Eye Contact: normal  Speech: normal rate and volume, well-articulated  Mood: less anxious  Affect: congruent  Perception: within normal limits  Thought Content: within normal limits  Thought Process: logical, coherent and goal-directed  Insight: good  Judgment: intact  Ability to understand instructions: Yes  Ability to respond meaningfully: Yes  Morbid Ideation: passive thoughts of death  Suicide

## 2023-06-05 RX ORDER — BUPROPION HYDROCHLORIDE 200 MG/1
TABLET, EXTENDED RELEASE ORAL
Qty: 60 TABLET | Refills: 0 | Status: SHIPPED
Start: 2023-06-05 | End: 2023-06-08 | Stop reason: ALTCHOICE

## 2023-06-05 NOTE — TELEPHONE ENCOUNTER
Last office visit :04/04/2023    Future Appointments   Date Time Provider [address]   6/8/2023  8:30 AM RAVEN Black CNP [facility]   6/23/2023  9:00 AM Cha Magdaleno, PhD Mulu [facility]shaw [facility]

## 2023-06-08 ENCOUNTER — OFFICE VISIT (OUTPATIENT)
Dept: PSYCHIATRY | Age: 28
End: 2023-06-08
Payer: COMMERCIAL

## 2023-06-08 VITALS
HEIGHT: 70 IN | SYSTOLIC BLOOD PRESSURE: 116 MMHG | DIASTOLIC BLOOD PRESSURE: 72 MMHG | BODY MASS INDEX: 25.91 KG/M2 | HEART RATE: 104 BPM | WEIGHT: 181 LBS

## 2023-06-08 DIAGNOSIS — F41.1 GAD (GENERALIZED ANXIETY DISORDER): ICD-10-CM

## 2023-06-08 DIAGNOSIS — F90.2 ADHD (ATTENTION DEFICIT HYPERACTIVITY DISORDER), COMBINED TYPE: Primary | ICD-10-CM

## 2023-06-08 DIAGNOSIS — F43.10 PTSD (POST-TRAUMATIC STRESS DISORDER): ICD-10-CM

## 2023-06-08 DIAGNOSIS — F32.A DEPRESSION, UNSPECIFIED DEPRESSION TYPE: ICD-10-CM

## 2023-06-08 PROCEDURE — 99205 OFFICE O/P NEW HI 60 MIN: CPT | Performed by: REGISTERED NURSE

## 2023-06-08 RX ORDER — BUPROPION HYDROCHLORIDE 150 MG/1
150 TABLET ORAL EVERY MORNING
Qty: 60 TABLET | Refills: 0 | Status: SHIPPED | OUTPATIENT
Start: 2023-06-08 | End: 2023-08-07

## 2023-06-08 ASSESSMENT — ANXIETY QUESTIONNAIRES
GAD7 TOTAL SCORE: 12
4. TROUBLE RELAXING: 3
6. BECOMING EASILY ANNOYED OR IRRITABLE: 1
1. FEELING NERVOUS, ANXIOUS, OR ON EDGE: 2
IF YOU CHECKED OFF ANY PROBLEMS ON THIS QUESTIONNAIRE, HOW DIFFICULT HAVE THESE PROBLEMS MADE IT FOR YOU TO DO YOUR WORK, TAKE CARE OF THINGS AT HOME, OR GET ALONG WITH OTHER PEOPLE: EXTREMELY DIFFICULT
5. BEING SO RESTLESS THAT IT IS HARD TO SIT STILL: 2
7. FEELING AFRAID AS IF SOMETHING AWFUL MIGHT HAPPEN: 1
2. NOT BEING ABLE TO STOP OR CONTROL WORRYING: 1
3. WORRYING TOO MUCH ABOUT DIFFERENT THINGS: 2

## 2023-06-08 ASSESSMENT — PATIENT HEALTH QUESTIONNAIRE - PHQ9
5. POOR APPETITE OR OVEREATING: 3
SUM OF ALL RESPONSES TO PHQ QUESTIONS 1-9: 19
7. TROUBLE CONCENTRATING ON THINGS, SUCH AS READING THE NEWSPAPER OR WATCHING TELEVISION: 3
3. TROUBLE FALLING OR STAYING ASLEEP: 2
SUM OF ALL RESPONSES TO PHQ9 QUESTIONS 1 & 2: 4
SUM OF ALL RESPONSES TO PHQ QUESTIONS 1-9: 19
9. THOUGHTS THAT YOU WOULD BE BETTER OFF DEAD, OR OF HURTING YOURSELF: 0
10. IF YOU CHECKED OFF ANY PROBLEMS, HOW DIFFICULT HAVE THESE PROBLEMS MADE IT FOR YOU TO DO YOUR WORK, TAKE CARE OF THINGS AT HOME, OR GET ALONG WITH OTHER PEOPLE: 3
SUM OF ALL RESPONSES TO PHQ QUESTIONS 1-9: 19
8. MOVING OR SPEAKING SO SLOWLY THAT OTHER PEOPLE COULD HAVE NOTICED. OR THE OPPOSITE, BEING SO FIGETY OR RESTLESS THAT YOU HAVE BEEN MOVING AROUND A LOT MORE THAN USUAL: 2
SUM OF ALL RESPONSES TO PHQ QUESTIONS 1-9: 19
2. FEELING DOWN, DEPRESSED OR HOPELESS: 1
1. LITTLE INTEREST OR PLEASURE IN DOING THINGS: 3
6. FEELING BAD ABOUT YOURSELF - OR THAT YOU ARE A FAILURE OR HAVE LET YOURSELF OR YOUR FAMILY DOWN: 2
4. FEELING TIRED OR HAVING LITTLE ENERGY: 3

## 2023-06-08 NOTE — PROGRESS NOTES
National Suicide Hotlines: 7-964-461-TALK (0-653.967.8295) and 1-270-HUTFSQN (0-921.813.2224) and Local psychiatric Emergency Services given to patient during the office visit. Total time spent on this encounter:  68 min    Thank you for consult. Please do not hesitate to contact provider if there are additional questions regarding patient.     Molly Giron DNP, PMHNP-BC, CNP  6/8/2023

## 2023-06-08 NOTE — PATIENT INSTRUCTIONS
Here are some of Psychiatric Emergency resources for you:     National suicide hotlines: 97 418128, 2-060-425-TALK (3-280.483.4257) and 4-817-JCKLWQQ (2-127.912.9924). 2.  Call 911 or go to any nearest emergency room   3. Access the [facility] Emergency Psychiatry Services:     - Go to the [facility] Psychiatric Emergency Services (PES) at [address], [address]   - Call the [address] at [phone].    - Call the [facility] Mobile Crisis Team at [phone]     Anti-depressant drugs:  This class of drugs can cause sedation, blurred vision, dry-mouth, constipation, postural hypotension, urinary retention, tachycardia, muscle tremors, agitation, headache, skin rash, photo sensitivity, excessive weight gain, glaucoma, heart disease, lowering seizure threshold, increase risk of suicidal thinking or ideations, excessive sweating, sextual dysfunction, insomnia, anxiety, bruxism, GI bleeding, pregnancy complications and birth defects, possible death, etc.      Anti-anxiety drugs: Sedation, morning hangover, ataxia, nausea, respiratory depression, decrease cognitive function, light-headiness, withdrawal effects, anterograde amnesia, possible death, etc.

## 2023-06-20 DIAGNOSIS — F90.9 ATTENTION DEFICIT HYPERACTIVITY DISORDER (ADHD), UNSPECIFIED ADHD TYPE: ICD-10-CM

## 2023-06-20 RX ORDER — ATOMOXETINE 40 MG/1
CAPSULE ORAL
Qty: 60 CAPSULE | Refills: 0 | Status: SHIPPED | OUTPATIENT
Start: 2023-06-20

## 2023-06-20 NOTE — TELEPHONE ENCOUNTER
Last ov 04 04 23  Future Appointments   Date Time Provider Leigha Mckeon   6/23/2023  9:00 AM Mango Pierce, PhD Kash [facility]   7/20/2023  9:30 AM RAVEN Alfred CNP [facility]

## 2023-06-23 ENCOUNTER — OFFICE VISIT (OUTPATIENT)
Dept: PSYCHOLOGY | Age: 28
End: 2023-06-23
Payer: COMMERCIAL

## 2023-06-23 DIAGNOSIS — F34.1 PERSISTENT DEPRESSIVE DISORDER WITH ANXIOUS DISTRESS, CURRENTLY MODERATE: Primary | ICD-10-CM

## 2023-06-23 PROCEDURE — 90832 PSYTX W PT 30 MINUTES: CPT | Performed by: PSYCHOLOGIST

## 2023-06-23 NOTE — PROGRESS NOTES
questions in a row, then she will steer off of that narrative and ground herself by asking \"okay what do I need to pay attention to in this moment. \"   Pt Behavioral Change Plan:  Pt set the following goals:  Pt scheduled F/U visit in four weeks  See section 'A'

## 2023-07-14 ENCOUNTER — OFFICE VISIT (OUTPATIENT)
Dept: PSYCHOLOGY | Age: 28
End: 2023-07-14
Payer: COMMERCIAL

## 2023-07-14 DIAGNOSIS — F34.1 PERSISTENT DEPRESSIVE DISORDER WITH ANXIOUS DISTRESS, CURRENTLY MODERATE: Primary | ICD-10-CM

## 2023-07-14 PROCEDURE — 90832 PSYTX W PT 30 MINUTES: CPT | Performed by: PSYCHOLOGIST

## 2023-07-14 ASSESSMENT — PATIENT HEALTH QUESTIONNAIRE - PHQ9
9. THOUGHTS THAT YOU WOULD BE BETTER OFF DEAD, OR OF HURTING YOURSELF: 0
6. FEELING BAD ABOUT YOURSELF - OR THAT YOU ARE A FAILURE OR HAVE LET YOURSELF OR YOUR FAMILY DOWN: 2
1. LITTLE INTEREST OR PLEASURE IN DOING THINGS: 3
4. FEELING TIRED OR HAVING LITTLE ENERGY: 3
7. TROUBLE CONCENTRATING ON THINGS, SUCH AS READING THE NEWSPAPER OR WATCHING TELEVISION: 2
5. POOR APPETITE OR OVEREATING: 2
SUM OF ALL RESPONSES TO PHQ QUESTIONS 1-9: 19
2. FEELING DOWN, DEPRESSED OR HOPELESS: 2
SUM OF ALL RESPONSES TO PHQ QUESTIONS 1-9: 19
8. MOVING OR SPEAKING SO SLOWLY THAT OTHER PEOPLE COULD HAVE NOTICED. OR THE OPPOSITE, BEING SO FIGETY OR RESTLESS THAT YOU HAVE BEEN MOVING AROUND A LOT MORE THAN USUAL: 2
SUM OF ALL RESPONSES TO PHQ QUESTIONS 1-9: 19
SUM OF ALL RESPONSES TO PHQ9 QUESTIONS 1 & 2: 5
3. TROUBLE FALLING OR STAYING ASLEEP: 3
SUM OF ALL RESPONSES TO PHQ QUESTIONS 1-9: 19

## 2023-07-14 ASSESSMENT — ANXIETY QUESTIONNAIRES
2. NOT BEING ABLE TO STOP OR CONTROL WORRYING: 2-OVER HALF THE DAYS
4. TROUBLE RELAXING: 3-NEARLY EVERY DAY
6. BECOMING EASILY ANNOYED OR IRRITABLE: 2-OVER HALF THE DAYS
3. WORRYING TOO MUCH ABOUT DIFFERENT THINGS: 2-OVER HALF THE DAYS
5. BEING SO RESTLESS THAT IT IS HARD TO SIT STILL: 2-OVER HALF THE DAYS
GAD7 TOTAL SCORE: 14
7. FEELING AFRAID AS IF SOMETHING AWFUL MIGHT HAPPEN: 1-SEVERAL DAYS
1. FEELING NERVOUS, ANXIOUS, OR ON EDGE: 2

## 2023-07-20 ENCOUNTER — OFFICE VISIT (OUTPATIENT)
Dept: PSYCHIATRY | Age: 28
End: 2023-07-20
Payer: COMMERCIAL

## 2023-07-20 VITALS
HEIGHT: 70 IN | DIASTOLIC BLOOD PRESSURE: 78 MMHG | SYSTOLIC BLOOD PRESSURE: 110 MMHG | WEIGHT: 187.6 LBS | HEART RATE: 79 BPM | BODY MASS INDEX: 26.86 KG/M2

## 2023-07-20 DIAGNOSIS — F33.0 MILD EPISODE OF RECURRENT MAJOR DEPRESSIVE DISORDER (HCC): ICD-10-CM

## 2023-07-20 DIAGNOSIS — F90.2 ADHD (ATTENTION DEFICIT HYPERACTIVITY DISORDER), COMBINED TYPE: ICD-10-CM

## 2023-07-20 DIAGNOSIS — F43.10 PTSD (POST-TRAUMATIC STRESS DISORDER): ICD-10-CM

## 2023-07-20 DIAGNOSIS — F41.1 GAD (GENERALIZED ANXIETY DISORDER): Primary | ICD-10-CM

## 2023-07-20 PROCEDURE — 99213 OFFICE O/P EST LOW 20 MIN: CPT | Performed by: REGISTERED NURSE

## 2023-07-20 RX ORDER — SERTRALINE HYDROCHLORIDE 100 MG/1
100 TABLET, FILM COATED ORAL DAILY
Qty: 90 TABLET | Refills: 0 | Status: SHIPPED | OUTPATIENT
Start: 2023-07-20 | End: 2023-10-18

## 2023-07-20 RX ORDER — ATOMOXETINE 40 MG/1
40 CAPSULE ORAL 2 TIMES DAILY
Qty: 180 CAPSULE | Refills: 0 | Status: SHIPPED | OUTPATIENT
Start: 2023-07-20 | End: 2023-10-18

## 2023-07-20 RX ORDER — BUPROPION HYDROCHLORIDE 300 MG/1
300 TABLET ORAL EVERY MORNING
Qty: 90 TABLET | Refills: 0 | Status: SHIPPED | OUTPATIENT
Start: 2023-07-20 | End: 2023-10-18

## 2023-07-20 RX ORDER — CLONIDINE HYDROCHLORIDE 0.1 MG/1
0.1 TABLET ORAL NIGHTLY
Qty: 90 TABLET | Refills: 0 | Status: SHIPPED | OUTPATIENT
Start: 2023-07-20 | End: 2023-10-18

## 2023-07-20 NOTE — PATIENT INSTRUCTIONS
Here are some of Psychiatric Emergency resources for you:     National suicide hotlines: 97 838413, 5-221-121-TALK (2-650.395.4182) and 5-326-ZOXHXFV (9-859.363.9546). 2.  Call 911 or go to any nearest emergency room   3. Access the [facility] Emergency Psychiatry Services:     - Go to the [facility] Psychiatric Emergency Services (PES) at [address], [address] [place]   - Call the [address] at [phone]. Anti-anxiety drugs: Sedation, morning hangover, ataxia, nausea, respiratory depression, decrease cognitive function, light-headiness, withdrawal effects, anterograde amnesia, possible death, etc.     Anti-depressant drugs:  This class of drugs can cause sedation, blurred vision, dry-mouth, constipation, postural hypotension, urinary retention, tachycardia, muscle tremors, agitation, headache, skin rash, photo sensitivity, excessive weight gain, glaucoma, heart disease, lowering seizure threshold, increase risk of suicidal thinking or ideations, excessive sweating, sextual dysfunction, insomnia, anxiety, bruxism, GI bleeding, pregnancy complications and birth defects, possible death, etc.

## 2023-07-20 NOTE — PROGRESS NOTES
wake up. Run out of Wellbutrin  mg daily for about 4 days. Noticed in creased appetite slightly. 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 Wants to explore other positions- open to see other roles than current position as  at [employer]. Reports trained ownself for that position but now other colleagues coming to the patient for help. Work place env't can get difficult at times. Future oriented. Denies having suicidal or homicidal thoughts. Interim  call/message:     Context: OV  Location: in mind-   Duration/time: chronic   Severity: moderate   Associated sxs: as above    Brief Medical Hx:     Past Medical History:   Diagnosis Date    Anxiety     Depression     Gender dysphoria        ROS: Negative for nausea, fever, headaches, vision problems, dysuria, abd pain, chest pain or SOB    Past Psych Medications trial: Cymbalta ( caused tingling), Lexapro ( ineffective)    Current Outpatient Medications   Medication Sig Dispense Refill    buPROPion (WELLBUTRIN XL) 300 MG extended release tablet Take 1 tablet by mouth every morning TAKE ONE TABLET OF WELLBUTRIN  MG EVERY MORNING. 90 tablet 0    atomoxetine (STRATTERA) 40 MG capsule Take 1 capsule by mouth 2 times daily TAKE ONE CAPSULE STRATTERA 40 MG TWICE DAILY. 180 capsule 0    sertraline (ZOLOFT) 100 MG tablet Take 1 tablet by mouth daily 90 tablet 0    cloNIDine (CATAPRES) 0.1 MG tablet Take 1 tablet by mouth nightly 90 tablet 0    Multiple Vitamin (MULTI-DAY PO) Take 1 tablet by mouth daily      finasteride (PROSCAR) 5 MG tablet Take 1 tablet by mouth daily      estradiol (ESTRACE) 2 MG tablet Take 1 tablet by mouth 4 tablets daily      spironolactone (ALDACTONE) 100 MG tablet Take 1 tablet by mouth 2 times daily       No current facility-administered medications for this visit.        O:  Wt Readings from Last 3 Encounters:   07/20/23 187 lb 9.6 oz (85.1 kg)   06/08/23 181 lb

## 2023-08-15 ENCOUNTER — OFFICE VISIT (OUTPATIENT)
Age: 28
End: 2023-08-15

## 2023-08-15 ENCOUNTER — TELEPHONE (OUTPATIENT)
Dept: INTERNAL MEDICINE CLINIC | Age: 28
End: 2023-08-15

## 2023-08-15 VITALS
BODY MASS INDEX: 27.23 KG/M2 | WEIGHT: 190.2 LBS | OXYGEN SATURATION: 99 % | SYSTOLIC BLOOD PRESSURE: 114 MMHG | DIASTOLIC BLOOD PRESSURE: 75 MMHG | HEIGHT: 70 IN | TEMPERATURE: 97.4 F | HEART RATE: 91 BPM

## 2023-08-15 DIAGNOSIS — R31.0 GROSS HEMATURIA: Primary | ICD-10-CM

## 2023-08-15 LAB
APPEARANCE FLUID: CLEAR
BILIRUBIN, POC: NEGATIVE
BLOOD URINE, POC: NEGATIVE
CLARITY, POC: CLEAR
COLOR, POC: YELLOW
GLUCOSE URINE, POC: NEGATIVE
KETONES, POC: NEGATIVE
LEUKOCYTE EST, POC: NEGATIVE
NITRITE, POC: NEGATIVE
PH, POC: 7
PROTEIN, POC: NEGATIVE
SPECIFIC GRAVITY, POC: 1.02
UROBILINOGEN, POC: NORMAL

## 2023-08-15 ASSESSMENT — ENCOUNTER SYMPTOMS
BLOOD IN STOOL: 0
ABDOMINAL PAIN: 0

## 2023-08-15 NOTE — TELEPHONE ENCOUNTER
Would not expect this to be related to HRT. Recommend she come to the office today, if unable to do that go to urgent care.

## 2023-08-15 NOTE — TELEPHONE ENCOUNTER
Spoke to patient and appt made for 8.17.2023 due to a work meeting today. Advised to go to urgent care if gets worse.

## 2023-08-18 ENCOUNTER — OFFICE VISIT (OUTPATIENT)
Dept: INTERNAL MEDICINE CLINIC | Age: 28
End: 2023-08-18
Payer: COMMERCIAL

## 2023-08-18 VITALS
OXYGEN SATURATION: 100 % | DIASTOLIC BLOOD PRESSURE: 62 MMHG | TEMPERATURE: 98.1 F | SYSTOLIC BLOOD PRESSURE: 118 MMHG | WEIGHT: 189.5 LBS | BODY MASS INDEX: 27.19 KG/M2 | HEART RATE: 91 BPM

## 2023-08-18 DIAGNOSIS — K62.5 RECTAL BLEEDING: ICD-10-CM

## 2023-08-18 DIAGNOSIS — E55.9 VITAMIN D DEFICIENCY: ICD-10-CM

## 2023-08-18 DIAGNOSIS — R31.0 GROSS HEMATURIA: Primary | ICD-10-CM

## 2023-08-18 DIAGNOSIS — R23.3 BRUISES EASILY: ICD-10-CM

## 2023-08-18 LAB
BASOPHILS # BLD: 0 K/UL (ref 0–0.2)
BASOPHILS NFR BLD: 0.5 %
DEPRECATED RDW RBC AUTO: 12.3 % (ref 12.4–15.4)
EOSINOPHIL # BLD: 0.2 K/UL (ref 0–0.6)
EOSINOPHIL NFR BLD: 3.5 %
HCT VFR BLD AUTO: 37 % (ref 36–48)
HGB BLD-MCNC: 12.8 G/DL (ref 12–16)
INR PPP: 1.05 (ref 0.84–1.16)
LYMPHOCYTES # BLD: 2.6 K/UL (ref 1–5.1)
LYMPHOCYTES NFR BLD: 41.6 %
MCH RBC QN AUTO: 32.2 PG (ref 26–34)
MCHC RBC AUTO-ENTMCNC: 34.7 G/DL (ref 31–36)
MCV RBC AUTO: 92.7 FL (ref 80–100)
MONOCYTES # BLD: 0.3 K/UL (ref 0–1.3)
MONOCYTES NFR BLD: 5.1 %
NEUTROPHILS # BLD: 3.1 K/UL (ref 1.7–7.7)
NEUTROPHILS NFR BLD: 49.3 %
PLATELET # BLD AUTO: 297 K/UL (ref 135–450)
PMV BLD AUTO: 8.3 FL (ref 5–10.5)
PROTHROMBIN TIME: 13.7 SEC (ref 11.5–14.8)
RBC # BLD AUTO: 3.99 M/UL (ref 4–5.2)
WBC # BLD AUTO: 6.2 K/UL (ref 4–11)

## 2023-08-18 PROCEDURE — 81003 URINALYSIS AUTO W/O SCOPE: CPT | Performed by: NURSE PRACTITIONER

## 2023-08-18 PROCEDURE — 99214 OFFICE O/P EST MOD 30 MIN: CPT | Performed by: NURSE PRACTITIONER

## 2023-08-18 RX ORDER — SERTRALINE HYDROCHLORIDE 100 MG/1
100 TABLET, FILM COATED ORAL DAILY
Qty: 90 TABLET | Refills: 0 | Status: CANCELLED | OUTPATIENT
Start: 2023-08-18 | End: 2023-11-16

## 2023-08-18 RX ORDER — BUPROPION HYDROCHLORIDE 300 MG/1
300 TABLET ORAL EVERY MORNING
Qty: 90 TABLET | Refills: 0 | Status: SHIPPED | OUTPATIENT
Start: 2023-08-18 | End: 2023-11-16

## 2023-08-18 RX ORDER — BUPROPION HYDROCHLORIDE 300 MG/1
300 TABLET ORAL EVERY MORNING
Qty: 90 TABLET | Refills: 0 | Status: CANCELLED | OUTPATIENT
Start: 2023-08-18 | End: 2023-11-16

## 2023-08-18 RX ORDER — SPIRONOLACTONE 100 MG/1
100 TABLET, FILM COATED ORAL 2 TIMES DAILY
Qty: 30 TABLET | Status: CANCELLED | OUTPATIENT
Start: 2023-08-18

## 2023-08-18 RX ORDER — SERTRALINE HYDROCHLORIDE 100 MG/1
100 TABLET, FILM COATED ORAL DAILY
Qty: 90 TABLET | Refills: 0 | Status: SHIPPED | OUTPATIENT
Start: 2023-08-18 | End: 2023-11-16

## 2023-08-18 ASSESSMENT — ENCOUNTER SYMPTOMS
DIARRHEA: 0
NAUSEA: 0
ABDOMINAL PAIN: 0
VOMITING: 0
CONSTIPATION: 0
BLOOD IN STOOL: 1

## 2023-08-18 NOTE — PROGRESS NOTES
Called lab for STAT  for protime  Tracking number is [id number]
Expiration Date:   8/18/2024    [facility] - Jeferson Santos MD, Gastroenterology, [facility]     Referral Priority:   Routine     Referral Type:   Eval and Treat     Referral Reason:   Specialty Services Required     Referred to Provider:   Jeferson Santos MD     Requested Specialty:   Gastroenterology     Number of Visits Requested:   1       Return if symptoms worsen or fail to improve. OR sooner with questions, concerns, worsening symptoms    RAVEN GLASGOW  8/18/2023  3:20 PM    Discussed use, benefit, and side effects of prescribed medications. Barriers to medication compliance addressed. Discussed all ordered testing and labs. All patient questions answered. Patient agreeable with plan above. Please note that this chart was generated using dragon dictation software. Although every effort was made to ensure the accuracy of this automated transcription, some errors in transcription may have occurred.

## 2023-08-18 NOTE — TELEPHONE ENCOUNTER
Future Appointments   Date Time Provider [address]   9/15/2023  9:00 AM Imelda Spears, PhD [facility]   10/26/2023  9:30 AM RAVEN Epstein - CNP [facility]     Last appt on [phone]

## 2023-08-19 LAB
25(OH)D3 SERPL-MCNC: 22.8 NG/ML
ALBUMIN SERPL-MCNC: 3.7 G/DL (ref 3.4–5)
ALP SERPL-CCNC: 78 U/L (ref 40–129)
ALT SERPL-CCNC: 11 U/L (ref 10–40)
AST SERPL-CCNC: 12 U/L (ref 15–37)
BILIRUB DIRECT SERPL-MCNC: <0.2 MG/DL (ref 0–0.3)
BILIRUB INDIRECT SERPL-MCNC: ABNORMAL MG/DL (ref 0–1)
BILIRUB SERPL-MCNC: 0.5 MG/DL (ref 0–1)
PROT SERPL-MCNC: 6 G/DL (ref 6.4–8.2)

## 2023-08-21 ENCOUNTER — TELEPHONE (OUTPATIENT)
Dept: INTERNAL MEDICINE CLINIC | Age: 28
End: 2023-08-21

## 2023-08-21 NOTE — TELEPHONE ENCOUNTER
Left message on machine per [employer] to call back for results. ----- Message from RAVEN Guevara sent at 8/21/2023  9:08 AM EDT -----  Vitamin D low, improving. Has she been taking vitamin D? If not, should take 2000 IU daily, available over the counter. Other labs unremarkable. Can she drop off urine sample?  And needs to schedule CT scan

## 2023-09-15 ENCOUNTER — OFFICE VISIT (OUTPATIENT)
Dept: PSYCHOLOGY | Age: 28
End: 2023-09-15
Payer: COMMERCIAL

## 2023-09-15 DIAGNOSIS — F34.1 PERSISTENT DEPRESSIVE DISORDER WITH ANXIOUS DISTRESS, CURRENTLY MODERATE: Primary | ICD-10-CM

## 2023-09-15 PROCEDURE — 90832 PSYTX W PT 30 MINUTES: CPT | Performed by: PSYCHOLOGIST

## 2023-09-15 NOTE — PROGRESS NOTES
Behavioral Health Consultation  Dang Allen, Ph.D.  Psychologist  9/15/2023  9:30 AM EST      Time spent with Patient: 30 minutes  This is patient's  twenty-seventh  [facility] appointment. Reason for Consult: depression; anxiety; Referring Provider: Shukri Martinez [id number]    Feedback for PCP:     Pt provided informed consent for the behavioral health program. Discussed with patient model of service to include the limits of confidentiality (i.e. abuse reporting, suicide intervention, etc.) and short-term intervention focused approach. Pt indicated understanding. Feedback given to PCP. S:  The patient reports that she is excited that her surgery date is drawing near (Nov 6). She says that she has been \"up and down\" but appears to be more up than down, as she recounted how work and personal life has been going. She did not identify any areas of current concern.      Social History     Tobacco Use    Smoking status: Never    Smokeless tobacco: Never   Substance Use Topics    Alcohol use: Not Currently        Illicit drugs:   Social History     Substance and Sexual Activity   Drug Use Not Currently        O:  MSE:  Appearance: good hygiene   Attitude: cooperative and friendly  Consciousness: alert  Orientation: oriented to person, place, time, general circumstance  Memory: recent and remote memory intact  Attention/Concentration: intact during session  Psychomotor Activity: normal  Eye Contact: normal  Speech: normal rate and volume, well-articulated  Mood: less anxious  Affect: congruent  Perception: within normal limits  Thought Content: within normal limits  Thought Process: logical, coherent and goal-directed  Insight: good  Judgment: intact  Ability to understand instructions: Yes  Ability to respond meaningfully: Yes  Morbid Ideation: passive thoughts of death  Suicide Assessment: suicidal ideation without plan or intent  Homicidal Ideation: no    A:  She appears to

## 2023-10-13 ENCOUNTER — OFFICE VISIT (OUTPATIENT)
Dept: PSYCHOLOGY | Age: 28
End: 2023-10-13
Payer: COMMERCIAL

## 2023-10-13 DIAGNOSIS — F34.1 PERSISTENT DEPRESSIVE DISORDER WITH MIXED FEATURES, CURRENTLY MODERATE: Primary | ICD-10-CM

## 2023-10-13 DIAGNOSIS — F90.2 ATTENTION DEFICIT HYPERACTIVITY DISORDER (ADHD), COMBINED TYPE: ICD-10-CM

## 2023-10-13 PROCEDURE — 90832 PSYTX W PT 30 MINUTES: CPT | Performed by: PSYCHOLOGIST

## 2023-10-13 NOTE — PROGRESS NOTES
Behavioral Health Consultation  Nava Hicks, Ph.D.  Psychologist  10/13/2023  9:30 AM EST      Time spent with Patient: 30 minutes  This is patient's  twenty-seventh  [facility] appointment. Reason for Consult: depression; anxiety; [facility] [address] [address]    Feedback for PCP:     Pt provided informed consent for the behavioral health program. Discussed with patient model of service to include the limits of confidentiality (i.e. abuse reporting, suicide intervention, etc.) and short-term intervention focused approach. Pt indicated understanding. Feedback given to PCP. S:  The patient reports that her insurance company decided not to cover the entirety of the $15K surgery scheduled next month on Nov 6. This momentarily worried the patient but she had been saving some monies over the last year and will make other arrangements to move forward with the surgery \"no matter what. The patient is excited that it's finally here. She will schedule a F/U appointment after her recovery in January.       Social History     Tobacco Use    Smoking status: Never    Smokeless tobacco: Never   Substance Use Topics    Alcohol use: Not Currently        Illicit drugs:   Social History     Substance and Sexual Activity   Drug Use Not Currently        O:  MSE:  Appearance: good hygiene   Attitude: cooperative and friendly  Consciousness: alert  Orientation: oriented to person, place, time, general circumstance  Memory: recent and remote memory intact  Attention/Concentration: intact during session  Psychomotor Activity: normal  Eye Contact: normal  Speech: normal rate and volume, well-articulated  Mood: euthymic  Affect: congruent  Perception: within normal limits  Thought Content: within normal limits  Thought Process: logical, coherent and goal-directed  Insight: good  Judgment: intact  Ability to understand instructions: Yes  Ability to respond meaningfully: Yes  Morbid

## 2023-11-13 ENCOUNTER — TELEMEDICINE (OUTPATIENT)
Dept: PSYCHIATRY | Age: 28
End: 2023-11-13
Payer: COMMERCIAL

## 2023-11-13 DIAGNOSIS — F33.1 MODERATE EPISODE OF RECURRENT MAJOR DEPRESSIVE DISORDER (HCC): ICD-10-CM

## 2023-11-13 DIAGNOSIS — F43.10 PTSD (POST-TRAUMATIC STRESS DISORDER): ICD-10-CM

## 2023-11-13 DIAGNOSIS — F90.2 ADHD (ATTENTION DEFICIT HYPERACTIVITY DISORDER), COMBINED TYPE: Primary | ICD-10-CM

## 2023-11-13 DIAGNOSIS — F41.1 GAD (GENERALIZED ANXIETY DISORDER): ICD-10-CM

## 2023-11-13 PROCEDURE — 99214 OFFICE O/P EST MOD 30 MIN: CPT | Performed by: REGISTERED NURSE

## 2023-11-13 RX ORDER — TRAZODONE HYDROCHLORIDE 100 MG/1
100 TABLET ORAL NIGHTLY
Qty: 90 TABLET | Refills: 0 | Status: SHIPPED | OUTPATIENT
Start: 2023-11-13 | End: 2024-02-11

## 2024-01-04 ENCOUNTER — OFFICE VISIT (OUTPATIENT)
Dept: PSYCHIATRY | Age: 29
End: 2024-01-04
Payer: COMMERCIAL

## 2024-01-04 VITALS
SYSTOLIC BLOOD PRESSURE: 130 MMHG | TEMPERATURE: 98.6 F | BODY MASS INDEX: 28.89 KG/M2 | HEART RATE: 62 BPM | DIASTOLIC BLOOD PRESSURE: 70 MMHG | HEIGHT: 70 IN | WEIGHT: 201.8 LBS | OXYGEN SATURATION: 88 %

## 2024-01-04 DIAGNOSIS — F41.1 GAD (GENERALIZED ANXIETY DISORDER): ICD-10-CM

## 2024-01-04 DIAGNOSIS — F33.0 MILD EPISODE OF RECURRENT MAJOR DEPRESSIVE DISORDER (HCC): ICD-10-CM

## 2024-01-04 DIAGNOSIS — F90.2 ADHD (ATTENTION DEFICIT HYPERACTIVITY DISORDER), COMBINED TYPE: Primary | ICD-10-CM

## 2024-01-04 PROCEDURE — 99213 OFFICE O/P EST LOW 20 MIN: CPT | Performed by: REGISTERED NURSE

## 2024-01-04 RX ORDER — ATOMOXETINE 60 MG/1
60 CAPSULE ORAL DAILY
Qty: 30 CAPSULE | Refills: 2 | Status: SHIPPED | OUTPATIENT
Start: 2024-01-04 | End: 2024-02-03

## 2024-01-04 ASSESSMENT — PATIENT HEALTH QUESTIONNAIRE - PHQ9
SUM OF ALL RESPONSES TO PHQ QUESTIONS 1-9: 22
SUM OF ALL RESPONSES TO PHQ QUESTIONS 1-9: 22
5. POOR APPETITE OR OVEREATING: 3
SUM OF ALL RESPONSES TO PHQ QUESTIONS 1-9: 22
2. FEELING DOWN, DEPRESSED OR HOPELESS: 2
7. TROUBLE CONCENTRATING ON THINGS, SUCH AS READING THE NEWSPAPER OR WATCHING TELEVISION: 2
4. FEELING TIRED OR HAVING LITTLE ENERGY: 3
SUM OF ALL RESPONSES TO PHQ9 QUESTIONS 1 & 2: 5
SUM OF ALL RESPONSES TO PHQ QUESTIONS 1-9: 21
8. MOVING OR SPEAKING SO SLOWLY THAT OTHER PEOPLE COULD HAVE NOTICED. OR THE OPPOSITE, BEING SO FIGETY OR RESTLESS THAT YOU HAVE BEEN MOVING AROUND A LOT MORE THAN USUAL: 3
10. IF YOU CHECKED OFF ANY PROBLEMS, HOW DIFFICULT HAVE THESE PROBLEMS MADE IT FOR YOU TO DO YOUR WORK, TAKE CARE OF THINGS AT HOME, OR GET ALONG WITH OTHER PEOPLE: 3
9. THOUGHTS THAT YOU WOULD BE BETTER OFF DEAD, OR OF HURTING YOURSELF: 1
6. FEELING BAD ABOUT YOURSELF - OR THAT YOU ARE A FAILURE OR HAVE LET YOURSELF OR YOUR FAMILY DOWN: 2
1. LITTLE INTEREST OR PLEASURE IN DOING THINGS: 3
3. TROUBLE FALLING OR STAYING ASLEEP: 3

## 2024-01-04 ASSESSMENT — ANXIETY QUESTIONNAIRES
3. WORRYING TOO MUCH ABOUT DIFFERENT THINGS: 2
2. NOT BEING ABLE TO STOP OR CONTROL WORRYING: 2
4. TROUBLE RELAXING: 3
1. FEELING NERVOUS, ANXIOUS, OR ON EDGE: 3
7. FEELING AFRAID AS IF SOMETHING AWFUL MIGHT HAPPEN: 1
IF YOU CHECKED OFF ANY PROBLEMS ON THIS QUESTIONNAIRE, HOW DIFFICULT HAVE THESE PROBLEMS MADE IT FOR YOU TO DO YOUR WORK, TAKE CARE OF THINGS AT HOME, OR GET ALONG WITH OTHER PEOPLE: EXTREMELY DIFFICULT
5. BEING SO RESTLESS THAT IT IS HARD TO SIT STILL: 3
GAD7 TOTAL SCORE: 17
6. BECOMING EASILY ANNOYED OR IRRITABLE: 3

## 2024-01-04 ASSESSMENT — COLUMBIA-SUICIDE SEVERITY RATING SCALE - C-SSRS
2. HAVE YOU ACTUALLY HAD ANY THOUGHTS OF KILLING YOURSELF?: NO
1. WITHIN THE PAST MONTH, HAVE YOU WISHED YOU WERE DEAD OR WISHED YOU COULD GO TO SLEEP AND NOT WAKE UP?: YES
6. HAVE YOU EVER DONE ANYTHING, STARTED TO DO ANYTHING, OR PREPARED TO DO ANYTHING TO END YOUR LIFE?: NO

## 2024-01-04 NOTE — PATIENT INSTRUCTIONS
Here are some of Psychiatric Emergency resources for you:     National suicide hotlines: 988, 9-996-984-TALK (1-436.550.7506) and 9-070-SBNIBCA (1-654.375.9972).   2.  Call 911 or go to any nearest emergency room   3.   Access the [facility] Emergency Psychiatry Services:     - Go to the  Psychiatric Emergency Services (PES) at [address] [address]   - Call the  PES at [phone].    - Call the  Mobile Crisis Team at [phone]     Anti-depressant drugs: This class of drugs can cause sedation, blurred vision, dry-mouth, constipation, postural hypotension, urinary retention, tachycardia, muscle tremors, agitation, headache, skin rash, photo sensitivity, excessive weight gain, glaucoma, heart disease, lowering seizure threshold, increase risk of suicidal thinking or ideations, excessive sweating, sextual dysfunction, insomnia, anxiety, bruxism, GI bleeding, pregnancy complications and birth defects, possible death, etc.      Anti-anxiety drugs: Sedation, morning hangover, ataxia, nausea, respiratory depression, decrease cognitive function, light-headiness, withdrawal effects, anterograde amnesia, possible death, etc.                Insomnia : General Principles     You should sleep in only one location which is your bedroom. Do not watch TV, use computer or read in your bedroom. You want to associate your bedroom with sleep only. You should do these things in a different room     Your sleep environment should be dark with no light exposure     When you wake in the morning you should exposure yourself to bright light     Get into bed at the same time every night     Get up out of bed no later than 8 AM     No naps during daytime     Do not watch the clock     No caffeine after 2 PM      Try to exercise in the late afternoon or early evening as many days a week as possible

## 2024-01-04 NOTE — PROGRESS NOTES
Wellbutrin  mg daily for about 4 days. Noticed in creased appetite slightly. 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 Wants to explore other positions- open to see other roles than current position as  at [address] [place] [address]. Reports trained ownself for that position but now other colleagues coming to the patient for help. Work place env't can get difficult at times. Future oriented. Denies having suicidal or homicidal thoughts.     Interim  call/message: none     Context: OV  Location: in mind- anxiety, inattention, poor concentration.   Duration/time: chronic   Severity: mild  Associated sxs: as above    Brief Medical Hx:     Past Medical History:   Diagnosis Date    Anxiety     Depression     Gender dysphoria        ROS: no headaches, vision problems, dysuria, abd pain, chest pain or SOB    Past Psych Medications trial: Cymbalta ( caused tingling), Lexapro ( ineffective), Wellbutrin XL ( [facility] with opiates use     Current Outpatient Medications   Medication Sig Dispense Refill    atomoxetine (STRATTERA) 60 MG capsule Take 1 capsule by mouth daily 30 capsule 2    traZODone (DESYREL) 100 MG tablet Take 1 tablet by mouth nightly 90 tablet 0    PROGESTERONE PO Take by mouth      Multiple Vitamin (MULTI-DAY PO) Take 1 tablet by mouth daily      finasteride (PROSCAR) 5 MG tablet Take 1 tablet by mouth daily      estradiol (ESTRACE) 2 MG tablet Take 1 tablet by mouth 4 tablets daily      spironolactone (ALDACTONE) 100 MG tablet Take 1 tablet by mouth 2 times daily       No current facility-administered medications for this visit.       O:  Wt Readings from Last 3 Encounters:   01/04/24 91.5 kg (201 lb 12.8 oz)   08/18/23 86 kg (189 lb 8 oz)   08/15/23 86.3 kg (190 lb 3.2 oz)     Temp Readings from Last 3 Encounters:   01/04/24 98.6 °F (37 °C)   08/18/23 98.1 °F (36.7 °C) (Oral)   08/15/23 97.4 °F (36.3 °C) (Oral)     BP Readings from Last 3 Encounters:

## 2024-02-12 RX ORDER — ATOMOXETINE 40 MG/1
40 CAPSULE ORAL DAILY
Qty: 30 CAPSULE | Refills: 2 | Status: SHIPPED | OUTPATIENT
Start: 2024-02-12

## 2024-03-22 ENCOUNTER — OFFICE VISIT (OUTPATIENT)
Dept: PSYCHOLOGY | Age: 29
End: 2024-03-22
Payer: COMMERCIAL

## 2024-03-22 DIAGNOSIS — F34.1 PERSISTENT DEPRESSIVE DISORDER WITH ANXIOUS DISTRESS, CURRENTLY MODERATE: Primary | ICD-10-CM

## 2024-03-22 PROCEDURE — 90832 PSYTX W PT 30 MINUTES: CPT | Performed by: PSYCHOLOGIST

## 2024-03-22 ASSESSMENT — PATIENT HEALTH QUESTIONNAIRE - PHQ9
1. LITTLE INTEREST OR PLEASURE IN DOING THINGS: NEARLY EVERY DAY
SUM OF ALL RESPONSES TO PHQ QUESTIONS 1-9: 24
9. THOUGHTS THAT YOU WOULD BE BETTER OFF DEAD, OR OF HURTING YOURSELF: MORE THAN HALF THE DAYS
6. FEELING BAD ABOUT YOURSELF - OR THAT YOU ARE A FAILURE OR HAVE LET YOURSELF OR YOUR FAMILY DOWN: NEARLY EVERY DAY
5. POOR APPETITE OR OVEREATING: NEARLY EVERY DAY
4. FEELING TIRED OR HAVING LITTLE ENERGY: NEARLY EVERY DAY
2. FEELING DOWN, DEPRESSED OR HOPELESS: NEARLY EVERY DAY
3. TROUBLE FALLING OR STAYING ASLEEP: NEARLY EVERY DAY
SUM OF ALL RESPONSES TO PHQ QUESTIONS 1-9: 24
SUM OF ALL RESPONSES TO PHQ QUESTIONS 1-9: 24
7. TROUBLE CONCENTRATING ON THINGS, SUCH AS READING THE NEWSPAPER OR WATCHING TELEVISION: NEARLY EVERY DAY
SUM OF ALL RESPONSES TO PHQ9 QUESTIONS 1 & 2: 6
SUM OF ALL RESPONSES TO PHQ QUESTIONS 1-9: 22
8. MOVING OR SPEAKING SO SLOWLY THAT OTHER PEOPLE COULD HAVE NOTICED. OR THE OPPOSITE, BEING SO FIGETY OR RESTLESS THAT YOU HAVE BEEN MOVING AROUND A LOT MORE THAN USUAL: SEVERAL DAYS

## 2024-03-22 ASSESSMENT — ANXIETY QUESTIONNAIRES
3. WORRYING TOO MUCH ABOUT DIFFERENT THINGS: 3-NEARLY EVERY DAY
1. FEELING NERVOUS, ANXIOUS, OR ON EDGE: NEARLY EVERY DAY
5. BEING SO RESTLESS THAT IT IS HARD TO SIT STILL: 3-NEARLY EVERY DAY
2. NOT BEING ABLE TO STOP OR CONTROL WORRYING: 2-OVER HALF THE DAYS
GAD7 TOTAL SCORE: 19
6. BECOMING EASILY ANNOYED OR IRRITABLE: 3-NEARLY EVERY DAY
7. FEELING AFRAID AS IF SOMETHING AWFUL MIGHT HAPPEN: 2-OVER HALF THE DAYS
4. TROUBLE RELAXING: 3-NEARLY EVERY DAY

## 2024-03-22 NOTE — PROGRESS NOTES
goal-directed  Insight: good  Judgment: intact  Ability to understand instructions: Yes  Ability to respond meaningfully: Yes  Morbid Ideation: no   Suicide Assessment: no suicidal ideation, plan, or intent  Homicidal Ideation: no    A:  Patient is adjusting to some things that have changed and to some things that haven't. She was able to get the Genesight testing done during this visit in anticipation of beginning an antidepressant.        3/22/2024     9:00 AM 1/4/2024     9:00 AM 7/14/2023    10:00 AM 6/8/2023     8:00 AM 4/7/2023     9:00 AM 2/24/2023     2:00 PM 11/4/2022    10:00 AM   DANE 7 SCORE   DANE-7 Total Score  17  12      DANE-7 Total Score 19  14  13 13 14     Interpretation of DANE-7 score: 5-9 = mild anxiety, 10-14 = moderate anxiety, 15+ = severe anxiety. Recommend referral to behavioral health for scores 10 or greater.        3/22/2024     9:22 AM 1/4/2024     9:39 AM 7/14/2023    10:57 AM 6/8/2023     8:40 AM 4/7/2023     9:28 AM 2/24/2023     2:01 PM 1/10/2023     8:00 AM   PHQ Scores   PHQ2 Score 6 5 5 4 5 5 2   PHQ9 Score 24 22 19 19 20 17 6     Interpretation of Total Score Depression Severity: 1-4 = Minimal depression, 5-9 = Mild depression, 10-14 = Moderate depression, 15-19 = Moderately severe depression, 20-27 = Severe depression    Diagnosis:    1. Persistent depressive disorder with anxious distress, currently moderate                      Patient Active Problem List   Diagnosis    Attention deficit hyperactivity disorder (ADHD)    Persistent depressive disorder with mixed features, currently moderate         Plan:  Pt interventions:  Supportive techniques, Provided Psychoeducation re: treating anxiety and Emphasized self-care as important for managing overall health. The patient is in good spirits and her mood and affect are positive.       Pt Behavioral Change Plan:  Pt set the following goals:  Pt scheduled F/U visit in two weeks

## 2024-04-08 ENCOUNTER — OFFICE VISIT (OUTPATIENT)
Dept: INTERNAL MEDICINE CLINIC | Age: 29
End: 2024-04-08
Payer: COMMERCIAL

## 2024-04-08 VITALS
TEMPERATURE: 98 F | DIASTOLIC BLOOD PRESSURE: 68 MMHG | BODY MASS INDEX: 29.88 KG/M2 | WEIGHT: 208.25 LBS | SYSTOLIC BLOOD PRESSURE: 124 MMHG | HEART RATE: 84 BPM | OXYGEN SATURATION: 97 %

## 2024-04-08 DIAGNOSIS — F90.2 ATTENTION DEFICIT HYPERACTIVITY DISORDER (ADHD), COMBINED TYPE: ICD-10-CM

## 2024-04-08 DIAGNOSIS — F33.1 MODERATE EPISODE OF RECURRENT MAJOR DEPRESSIVE DISORDER (HCC): Primary | ICD-10-CM

## 2024-04-08 DIAGNOSIS — F41.9 ANXIETY: ICD-10-CM

## 2024-04-08 PROCEDURE — 99214 OFFICE O/P EST MOD 30 MIN: CPT | Performed by: NURSE PRACTITIONER

## 2024-04-08 RX ORDER — DESVENLAFAXINE SUCCINATE 50 MG/1
50 TABLET, EXTENDED RELEASE ORAL DAILY
Qty: 30 TABLET | Refills: 1 | Status: SHIPPED | OUTPATIENT
Start: 2024-04-08

## 2024-04-08 RX ORDER — BUSPIRONE HYDROCHLORIDE 5 MG/1
5 TABLET ORAL 2 TIMES DAILY
Qty: 60 TABLET | Refills: 1 | Status: SHIPPED | OUTPATIENT
Start: 2024-04-08 | End: 2024-05-08

## 2024-04-08 SDOH — ECONOMIC STABILITY: FOOD INSECURITY: WITHIN THE PAST 12 MONTHS, THE FOOD YOU BOUGHT JUST DIDN'T LAST AND YOU DIDN'T HAVE MONEY TO GET MORE.: NEVER TRUE

## 2024-04-08 SDOH — ECONOMIC STABILITY: INCOME INSECURITY: HOW HARD IS IT FOR YOU TO PAY FOR THE VERY BASICS LIKE FOOD, HOUSING, MEDICAL CARE, AND HEATING?: NOT HARD AT ALL

## 2024-04-08 SDOH — ECONOMIC STABILITY: FOOD INSECURITY: WITHIN THE PAST 12 MONTHS, YOU WORRIED THAT YOUR FOOD WOULD RUN OUT BEFORE YOU GOT MONEY TO BUY MORE.: NEVER TRUE

## 2024-04-08 NOTE — PROGRESS NOTES
Date: 4/8/2024                                               Subjective/Objective:     Chief Complaint   Patient presents with    Results     Go over Genesight results       HPI    Jimena Osuna is a 30 yo female, visit today to review genesight results. Identifies as transgender female. Receives gender affirming care through [facility] on estradiol and progesterone. S/p vaginoplasty 11/2023.     ADHD - on strattera, did not feel this was very helpful. She took 60 mg of strattera prior to stopping.      Depression - previously on sertraline (does not recall issue with this) and bupropion (stopped post op due to hallucinations with pain medications). Had itching with duloxetine. Had issues with escitalopram. She has noticed that not taking any medication has caused eating issues which has caused weight gain and worsening of anxiety. Has had SI. Denies plans to harm herself. \"Have thought I should, but I don't want to do it.\" No guns in the home. Support system is okay.     Saw psychiatry previously, has not followed up.          Patient Active Problem List    Diagnosis Date Noted    Persistent depressive disorder with mixed features, currently moderate 10/11/2022    Attention deficit hyperactivity disorder (ADHD) 06/06/2022    Moderate episode of recurrent major depressive disorder (HCC) 04/08/2024    Anxiety 04/08/2024       Past Medical History:   Diagnosis Date    Anxiety     Depression     Gender dysphoria        Past Surgical History:   Procedure Laterality Date    CHOLECYSTECTOMY      VAGINOPLASTY         Office Visit on 08/18/2023   Component Date Value Ref Range Status    WBC 08/18/2023 6.2  4.0 - 11.0 K/uL Final    RBC 08/18/2023 3.99 (L)  4.00 - 5.20 M/uL Final    Hemoglobin 08/18/2023 12.8  12.0 - 16.0 g/dL Final    Hematocrit 08/18/2023 37.0  36.0 - 48.0 % Final    MCV 08/18/2023 92.7  80.0 - 100.0 fL Final    MCH 08/18/2023 32.2  26.0 - 34.0 pg Final    MCHC 08/18/2023 34.7  31.0 - 36.0 g/dL

## 2024-04-08 NOTE — PATIENT INSTRUCTIONS
Psychiatric Emergency Services/Mobile Crisis, [phone]  Call \"988\" for the Suicide and Crisis Lifeline  Go to closest emergency room     Start pristiq and buspirone, follow up 6 weeks, unless you see psychiatry sooner    [facility]  [address] [place], [place] [address]  [phone]

## 2024-04-26 ENCOUNTER — OFFICE VISIT (OUTPATIENT)
Dept: PSYCHOLOGY | Age: 29
End: 2024-04-26
Payer: COMMERCIAL

## 2024-04-26 DIAGNOSIS — F90.2 ATTENTION DEFICIT HYPERACTIVITY DISORDER, COMBINED TYPE, MODERATE: ICD-10-CM

## 2024-04-26 DIAGNOSIS — F34.1 PERSISTENT DEPRESSIVE DISORDER WITH ANXIOUS DISTRESS, CURRENTLY MODERATE: Primary | ICD-10-CM

## 2024-04-26 PROCEDURE — 90832 PSYTX W PT 30 MINUTES: CPT | Performed by: PSYCHOLOGIST

## 2024-04-26 NOTE — PROGRESS NOTES
Behavioral Health Consultation  Venkat Sanders, Ph.D.  Psychologist  4/26/2024  9:00 AM EST      Time spent with Patient: 30 minutes  This is patient's  twenty-seventh  [facility] appointment.    Reason for Consult: depression; anxiety;   Referring Provider: Yulissa Pedroza APRN  [address]  [facility] [address]    Feedback for PCP:     Pt provided informed consent for the behavioral health program. Discussed with patient model of service to include the limits of confidentiality (i.e. abuse reporting, suicide intervention, etc.) and short-term intervention focused approach.  Pt indicated understanding.  Feedback given to PCP.    S:  The patient reports that she has started 50 mg of Pristiq and 18 mg of Concerta, both of which she reports tolerating well at this time. She says that she feels \"less foggy\" cognitively, and that that is helping her mood. She talked about her stormy relationship with her mother, and how she observed that when her mother wore her hair a certain way, she was \"nice to me.\" She said that her mother would often tell the patient that she was going to kill herself, or more passively tell her that \"I'm not going to be here one day.\" She also had the fear that that meant that her father might kill her mother. She said that her father, \"never felt like a father to me.\" She said that she realized recently that \"the only time I felt binh was when I was eating.\" She also said that \"I always felt like I had to improve.\"     Social History     Tobacco Use    Smoking status: Never    Smokeless tobacco: Never   Substance Use Topics    Alcohol use: Not Currently        Illicit drugs:   Social History     Substance and Sexual Activity   Drug Use Not Currently        O:  MSE:  Appearance: good hygiene   Attitude: cooperative and friendly  Consciousness: alert  Orientation: oriented to person, place, time, general circumstance  Memory: recent and remote memory intact  Attention/Concentration: intact

## 2024-06-07 ENCOUNTER — OFFICE VISIT (OUTPATIENT)
Dept: PSYCHOLOGY | Age: 29
End: 2024-06-07
Payer: COMMERCIAL

## 2024-06-07 DIAGNOSIS — F34.1 PERSISTENT DEPRESSIVE DISORDER WITH ANXIOUS DISTRESS, CURRENTLY MODERATE: Primary | ICD-10-CM

## 2024-06-07 DIAGNOSIS — F90.2 ATTENTION DEFICIT HYPERACTIVITY DISORDER (ADHD), COMBINED TYPE: ICD-10-CM

## 2024-06-07 PROCEDURE — 90832 PSYTX W PT 30 MINUTES: CPT | Performed by: PSYCHOLOGIST

## 2024-06-07 ASSESSMENT — PATIENT HEALTH QUESTIONNAIRE - PHQ9
2. FEELING DOWN, DEPRESSED OR HOPELESS: SEVERAL DAYS
SUM OF ALL RESPONSES TO PHQ QUESTIONS 1-9: 13
7. TROUBLE CONCENTRATING ON THINGS, SUCH AS READING THE NEWSPAPER OR WATCHING TELEVISION: MORE THAN HALF THE DAYS
SUM OF ALL RESPONSES TO PHQ9 QUESTIONS 1 & 2: 2
1. LITTLE INTEREST OR PLEASURE IN DOING THINGS: SEVERAL DAYS
8. MOVING OR SPEAKING SO SLOWLY THAT OTHER PEOPLE COULD HAVE NOTICED. OR THE OPPOSITE, BEING SO FIGETY OR RESTLESS THAT YOU HAVE BEEN MOVING AROUND A LOT MORE THAN USUAL: SEVERAL DAYS
5. POOR APPETITE OR OVEREATING: SEVERAL DAYS
4. FEELING TIRED OR HAVING LITTLE ENERGY: NEARLY EVERY DAY
SUM OF ALL RESPONSES TO PHQ QUESTIONS 1-9: 13
6. FEELING BAD ABOUT YOURSELF - OR THAT YOU ARE A FAILURE OR HAVE LET YOURSELF OR YOUR FAMILY DOWN: SEVERAL DAYS
SUM OF ALL RESPONSES TO PHQ QUESTIONS 1-9: 13
3. TROUBLE FALLING OR STAYING ASLEEP: NEARLY EVERY DAY
SUM OF ALL RESPONSES TO PHQ QUESTIONS 1-9: 13
9. THOUGHTS THAT YOU WOULD BE BETTER OFF DEAD, OR OF HURTING YOURSELF: NOT AT ALL

## 2024-06-07 ASSESSMENT — ANXIETY QUESTIONNAIRES
GAD7 TOTAL SCORE: 8
3. WORRYING TOO MUCH ABOUT DIFFERENT THINGS: 1-SEVERAL DAYS
4. TROUBLE RELAXING: 3-NEARLY EVERY DAY
6. BECOMING EASILY ANNOYED OR IRRITABLE: 0-NOT AT ALL
7. FEELING AFRAID AS IF SOMETHING AWFUL MIGHT HAPPEN: 0-NOT AT ALL
2. NOT BEING ABLE TO STOP OR CONTROL WORRYING: 0-NOT AT ALL
5. BEING SO RESTLESS THAT IT IS HARD TO SIT STILL: 2-OVER HALF THE DAYS
1. FEELING NERVOUS, ANXIOUS, OR ON EDGE: MORE THAN HALF THE DAYS

## 2024-06-07 NOTE — PROGRESS NOTES
Behavioral Health Consultation  Venkat Sanders, Ph.D.  Psychologist  6/7/2024  9:30 AM EST      Time spent with Patient: 30 minutes  This is patient's  twenty-seventh  [facility] appointment.    Reason for Consult: depression; anxiety;   Referring Provider: Yulissa Pedroza, RAVEN  [address]  [facility] [address]    Feedback for PCP:     Pt provided informed consent for the behavioral health program. Discussed with patient model of service to include the limits of confidentiality (i.e. abuse reporting, suicide intervention, etc.) and short-term intervention focused approach.  Pt indicated understanding.  Feedback given to PCP.    S:  The patient reports that she feels as though her ADHD meds are helping his concentration and follow-through at work. He says that even though his concentration and organization has improved, \"work is insane.\" She says that she wants to move to a department at the [employer] that is more analysis and research focused, which will pay better. She said that her situation at home is better, that the female roommate has eased her agitation and is \"less controlling.\" She said that she has seen Abrahan a few times, and that she plans \"on having a conversation\" with him on her reasonable expectations if they pursue a relationship.    Social History     Tobacco Use    Smoking status: Never    Smokeless tobacco: Never   Substance Use Topics    Alcohol use: Not Currently        Illicit drugs:   Social History     Substance and Sexual Activity   Drug Use Not Currently        O:  MSE:  Appearance: good hygiene   Attitude: cooperative and friendly  Consciousness: alert  Orientation: oriented to person, place, time, general circumstance  Memory: recent and remote memory intact  Attention/Concentration: intact during session  Psychomotor Activity: normal  Eye Contact: normal  Speech: normal rate and volume, well-articulated  Mood: less anxious  Affect: congruent  Perception: within normal limits  Thought

## 2024-06-22 DIAGNOSIS — F33.1 MODERATE EPISODE OF RECURRENT MAJOR DEPRESSIVE DISORDER (HCC): ICD-10-CM

## 2024-06-24 RX ORDER — ATOMOXETINE 40 MG/1
40 CAPSULE ORAL EVERY MORNING
Qty: 30 CAPSULE | Refills: 2 | OUTPATIENT
Start: 2024-06-24

## 2024-06-24 RX ORDER — DESVENLAFAXINE SUCCINATE 50 MG/1
50 TABLET, EXTENDED RELEASE ORAL DAILY
Qty: 30 TABLET | Refills: 0 | Status: SHIPPED | OUTPATIENT
Start: 2024-06-24

## 2024-06-24 NOTE — TELEPHONE ENCOUNTER
Medication:   Requested Prescriptions     Pending Prescriptions Disp Refills    desvenlafaxine succinate (PRISTIQ) 50 MG TB24 extended release tablet [Pharmacy Med Name: DESVENLAFAXINE SUCCNT ER 50 MG] 30 tablet 1     Sig: TAKE 1 TABLET BY MOUTH DAILY       Last Filled:  4.8.24    Patient Phone Number: [phone] (home)     Last appt: 4/8/2024   Next appt: Visit date not found  Future Appointments   Date Time Provider Department Center   6/28/2024  8:30 AM Venkat Sanders, PhD [place] [facility]

## 2024-06-24 NOTE — TELEPHONE ENCOUNTER
Medication:   Requested Prescriptions     Pending Prescriptions Disp Refills    atomoxetine (STRATTERA) 40 MG capsule [Pharmacy Med Name: ATOMOXETINE HCL 40 MG CAPSULE] 30 capsule 2     Sig: TAKE 1 CAPSULE BY MOUTH EVERY MORNING        Last Filled:      Patient Phone Number: [phone] (home)     Last appt: 1/4/2024   Next appt: Visit date not found    Last OARRS:        No data to display                Sharan is out of the office until 7/1. Please advise